# Patient Record
Sex: FEMALE | Race: WHITE | NOT HISPANIC OR LATINO | Employment: OTHER | URBAN - METROPOLITAN AREA
[De-identification: names, ages, dates, MRNs, and addresses within clinical notes are randomized per-mention and may not be internally consistent; named-entity substitution may affect disease eponyms.]

---

## 2018-09-17 ENCOUNTER — OFFICE VISIT (OUTPATIENT)
Dept: ENDOCRINOLOGY | Age: 78
End: 2018-09-17

## 2018-09-17 VITALS
WEIGHT: 179.4 LBS | HEART RATE: 77 BPM | SYSTOLIC BLOOD PRESSURE: 132 MMHG | HEIGHT: 67 IN | BODY MASS INDEX: 28.16 KG/M2 | DIASTOLIC BLOOD PRESSURE: 70 MMHG

## 2018-09-17 DIAGNOSIS — L65.9 HAIR LOSS: ICD-10-CM

## 2018-09-17 DIAGNOSIS — R53.82 CHRONIC FATIGUE: ICD-10-CM

## 2018-09-17 DIAGNOSIS — R79.89 ELEVATED DHEA: Primary | ICD-10-CM

## 2018-09-17 PROCEDURE — 99205 OFFICE O/P NEW HI 60 MIN: CPT | Performed by: INTERNAL MEDICINE

## 2018-09-17 RX ORDER — MELOXICAM 15 MG/1
15 TABLET ORAL DAILY
COMMUNITY
Start: 2018-09-12

## 2018-09-17 RX ORDER — PAROXETINE HYDROCHLORIDE 20 MG/1
20 TABLET, FILM COATED ORAL NIGHTLY
COMMUNITY
Start: 2018-09-12

## 2018-09-17 RX ORDER — OXYBUTYNIN CHLORIDE 10 MG/1
10 TABLET, EXTENDED RELEASE ORAL EVERY EVENING
COMMUNITY
Start: 2018-09-12

## 2018-09-17 RX ORDER — BETAMETHASONE DIPROPIONATE 0.5 MG/G
CREAM TOPICAL
COMMUNITY
Start: 2018-07-19 | End: 2021-08-09

## 2018-09-17 NOTE — PROGRESS NOTES
78 y.o.  Patient Care Team:  Provider, No Known as PCP - General    Chief complaint     NEW PATIENT CONSULT FOR ELEVATED DHEA.   C/O HAIR LOSS.    HPI   Patient is a 78-year-old white female came for consultation for elevated DHEA levels    Patient reported that she had been experiencing hair loss for about 3-4 years  She was evaluated by her primary care and sent to dermatology  Dermatologist apparently did some blood tests and noted that her DHEA levels were elevated  She was advised endocrine consultation    Patient was reporting extreme stress for the past one year  She apparently was remodeling her home and noted some itching areas on the skin 4 months and she has used betamethasone lotion for 4 months  She reported that she had hysterectomy in 1985 including the ovaries were taken out  Patient was on estrogen for about 10 years and then discontinued    Patient also reported that she used some kind of antifungal cream on her feet for 2 weeks prior to the lab testing  Extreme fatigue  Patient has been expressing fatigue for the past several months  Patient denied any headache or nausea or double vision  No history of head injury no recent weight gain or weight loss other than losing about 11 pounds due to hard work while she was remodeling her home  Patient denied any spotting    Patient had no prior history of congenital adrenal hyperplasia  She had children at the normal age  There is no family history of congenital adrenal hyperplasia    Lab reports were positive only for DHEA 39 and prolactin at 7.49 on August 1, 2018    Interval History      The following portions of the patient's history were reviewed and updated as appropriate: allergies, current medications, past family history, past medical history, past social history, past surgical history and problem list.    Past Medical History:   Diagnosis Date   • COPD (chronic obstructive pulmonary disease) (CMS/[facility])    • Lung infection        Family History    Problem Relation Age of Onset   • Heart disease Father    • Diabetes Paternal Aunt    • Diabetes Paternal Uncle        Social History     Social History   • Marital status: Unknown     Spouse name: N/A   • Number of children: N/A   • Years of education: N/A     Occupational History   • Not on file.     Social History Main Topics   • Smoking status: Not on file   • Smokeless tobacco: Not on file   • Alcohol use Not on file   • Drug use: Unknown   • Sexual activity: Not on file     Other Topics Concern   • Not on file     Social History Narrative   • No narrative on file       Allergies   Allergen Reactions   • Augmentin [Amoxicillin-Pot Clavulanate] Nausea And Vomiting   • Contrast Dye Unknown (See Comments)     BREATHING PROBLEMS         Current Outpatient Prescriptions:   •  betamethasone dipropionate (DIPROLENE) 0.05 % cream, , Disp: , Rfl:   •  meloxicam (MOBIC) 15 MG tablet, , Disp: , Rfl:   •  oxybutynin XL (DITROPAN-XL) 10 MG 24 hr tablet, , Disp: , Rfl:   •  PARoxetine (PAXIL) 20 MG tablet, , Disp: , Rfl:            Review of Systems   Constitutional: Positive for fatigue. Negative for chills and fever.   HENT: Negative for sore throat, trouble swallowing and voice change.    Eyes: Negative for pain and redness.   Respiratory: Negative for shortness of breath and wheezing.    Cardiovascular: Negative for chest pain and palpitations.   Gastrointestinal: Negative for abdominal pain, constipation, diarrhea, nausea and vomiting.   Endocrine: Negative for cold intolerance and heat intolerance.   Genitourinary: Positive for frequency.   Musculoskeletal: Negative for joint swelling.   Skin: Positive for rash (DRY SKIN ON HANDS AND ANKLES W/ITCHING). Negative for wound.   Neurological: Negative for tremors, light-headedness and headaches.   Hematological: Bruises/bleeds easily.   Psychiatric/Behavioral: Negative for confusion and sleep disturbance. The patient is not nervous/anxious.    All other systems reviewed  "and are negative.        Objective:  /70   Pulse 77   Ht 170.2 cm (67\")   Wt 81.4 kg (179 lb 6.4 oz)   BMI 28.10 kg/m²     Physical Exam   Constitutional: She is oriented to person, place, and time. She appears well-developed and well-nourished.   Eyes: Pupils are equal, round, and reactive to light. EOM are normal.   Neck: Normal range of motion. Neck supple.   Cardiovascular: Normal rate, regular rhythm, normal heart sounds and intact distal pulses.    Pulmonary/Chest: Effort normal and breath sounds normal.   Abdominal: Soft. Bowel sounds are normal. She exhibits distension.   Musculoskeletal: Normal range of motion. She exhibits no edema.   Neurological: She is alert and oriented to person, place, and time.   Skin: Skin is warm and dry.   Psychiatric: She has a normal mood and affect. Her behavior is normal.   Nursing note and vitals reviewed.        Results Review:    I reviewed the patient's new clinical results.  No results found for any previous visit.       No images are attached to the encounter.    Loc was seen today for abnormal lab.    Diagnoses and all orders for this visit:    Elevated DHEA (CMS/HCC)    Hair loss  -     T4, Free; Future  -     TSH; Future  -     Cortisol; Future  -     ACTH; Future  -     Sex Horm Binding Globulin; Future  -     Testosterone, Free, Total; Future  -     Estrogens, Total; Future  -     FSH & LH; Future  -     Comprehensive Metabolic Panel; Future  -     DHEA; Future  -     Prolactin; Future    Chronic fatigue  -     T4, Free; Future  -     TSH; Future  -     Cortisol; Future  -     ACTH; Future  -     Sex Horm Binding Globulin; Future  -     Testosterone, Free, Total; Future  -     Estrogens, Total; Future  -     FSH & LH; Future  -     Comprehensive Metabolic Panel; Future  -     DHEA; Future  -     Prolactin; Future        I discussed the various possibilities for the abnormal DHEA levels  It could be a lab error  She was also using antifungal cream at the " same time when these labs were drawn  It is possible that the medication was influencing the DHEA levels  No other corresponding hormones were checked  Patient does not have ovaries since they were removed in 1985 with hysterectomy    Excess DHEA production could only be adrenal source at this time  Patient had no other evaluation for adrenal function  She denies having had any imaging for adrenal glands such as CT abdomen    Patient is currently not taking any herbal remedies  She still uses betamethasone cream  Patient denied any galactorrhea  Last mammogram was normal    Fatigue and hair loss at the age of 78 could most likely be related to stress and I think it  elevated DHEA level not contributory    All the same sorts check all the adrenal hormones  I would also check pituitary hormone status along with thyroid  Patient is advised to get fasting lab tests done  After the lab results have been reviewed she'll be notified of any further plans for evaluation and management  Both patient and her daughter verbalized understanding    The total time spent  was more than 60 min of which greater than 35 min ( greater than 50% of the total time ) was spent face to face on counseling the patient on recommended evaluation and treatment options, instructions for management/treatment and /or follow up  and importance of compliance with chosen management or treatment options

## 2018-09-22 ENCOUNTER — RESULTS ENCOUNTER (OUTPATIENT)
Dept: ENDOCRINOLOGY | Age: 78
End: 2018-09-22

## 2018-09-22 DIAGNOSIS — R53.82 CHRONIC FATIGUE: ICD-10-CM

## 2018-09-22 DIAGNOSIS — L65.9 HAIR LOSS: ICD-10-CM

## 2018-09-23 PROBLEM — R79.89 ELEVATED DHEA: Status: ACTIVE | Noted: 2018-09-23

## 2018-10-03 ENCOUNTER — TELEPHONE (OUTPATIENT)
Dept: ENDOCRINOLOGY | Age: 78
End: 2018-10-03

## 2018-10-03 NOTE — TELEPHONE ENCOUNTER
----- Message from Ryleesindy Boyd sent at 10/1/2018  2:10 PM EDT -----  Contact: PATIENT   Patient has called in regards to getting her lab results from dr Amaya. Patient has requested this information from [facility]. Patient is requesting a call back once dr amaya review her results. [phone]     Called patient with normal lab results, left via voicemail.

## 2019-01-04 ENCOUNTER — TELEPHONE (OUTPATIENT)
Dept: ENDOCRINOLOGY | Age: 79
End: 2019-01-04

## 2019-01-04 NOTE — TELEPHONE ENCOUNTER
----- Message from Tiffany Lakhani MA sent at 1/4/2019  1:04 PM EST -----  Contact: Pt.  PT called asking that her lab results from last visit be faxed to [facility]  Fax# [phone]  So they will be there for her appt. Call pt  When done.  ATT. MAGDA HERR      Labs have been faxed to pcp

## 2019-10-16 ENCOUNTER — TRANSCRIBE ORDERS (OUTPATIENT)
Dept: ADMINISTRATIVE | Facility: HOSPITAL | Age: 79
End: 2019-10-16

## 2021-08-02 ENCOUNTER — TRANSCRIBE ORDERS (OUTPATIENT)
Dept: PREADMISSION TESTING | Facility: HOSPITAL | Age: 81
End: 2021-08-02

## 2021-08-02 DIAGNOSIS — Z01.818 OTHER SPECIFIED PRE-OPERATIVE EXAMINATION: Primary | ICD-10-CM

## 2021-08-09 ENCOUNTER — HOSPITAL ENCOUNTER (OUTPATIENT)
Dept: GENERAL RADIOLOGY | Facility: HOSPITAL | Age: 81
End: 2021-08-09

## 2021-08-09 ENCOUNTER — HOSPITAL ENCOUNTER (OUTPATIENT)
Dept: GENERAL RADIOLOGY | Facility: HOSPITAL | Age: 81
Discharge: HOME | End: 2021-08-09

## 2021-08-09 ENCOUNTER — PRE-ADMISSION TESTING (OUTPATIENT)
Dept: PREADMISSION TESTING | Facility: HOSPITAL | Age: 81
End: 2021-08-09

## 2021-08-09 VITALS
RESPIRATION RATE: 16 BRPM | HEIGHT: 68 IN | DIASTOLIC BLOOD PRESSURE: 79 MMHG | HEART RATE: 72 BPM | BODY MASS INDEX: 27.43 KG/M2 | TEMPERATURE: 97 F | SYSTOLIC BLOOD PRESSURE: 149 MMHG | WEIGHT: 181 LBS | OXYGEN SATURATION: 98 %

## 2021-08-09 LAB
ALBUMIN SERPL-MCNC: 4.5 G/DL (ref 3.5–5.2)
ALBUMIN/GLOB SERPL: 2 G/DL
ALP SERPL-CCNC: 113 U/L (ref 39–117)
ALT SERPL W P-5'-P-CCNC: 13 U/L (ref 1–33)
ANION GAP SERPL CALCULATED.3IONS-SCNC: 12 MMOL/L (ref 5–15)
APTT PPP: 33.5 SECONDS (ref 22.7–35.4)
AST SERPL-CCNC: 25 U/L (ref 1–32)
BACTERIA UR QL AUTO: ABNORMAL /HPF
BASOPHILS # BLD AUTO: 0.04 10*3/MM3 (ref 0–0.2)
BASOPHILS NFR BLD AUTO: 0.6 % (ref 0–1.5)
BILIRUB SERPL-MCNC: 0.5 MG/DL (ref 0–1.2)
BILIRUB UR QL STRIP: NEGATIVE
BUN SERPL-MCNC: 17 MG/DL (ref 8–23)
BUN/CREAT SERPL: 19.5 (ref 7–25)
CALCIUM SPEC-SCNC: 9.6 MG/DL (ref 8.6–10.5)
CHLORIDE SERPL-SCNC: 104 MMOL/L (ref 98–107)
CLARITY UR: CLEAR
CO2 SERPL-SCNC: 24 MMOL/L (ref 22–29)
COLOR UR: YELLOW
CREAT SERPL-MCNC: 0.87 MG/DL (ref 0.57–1)
DEPRECATED RDW RBC AUTO: 46.5 FL (ref 37–54)
EOSINOPHIL # BLD AUTO: 0.09 10*3/MM3 (ref 0–0.4)
EOSINOPHIL NFR BLD AUTO: 1.4 % (ref 0.3–6.2)
ERYTHROCYTE [DISTWIDTH] IN BLOOD BY AUTOMATED COUNT: 12.5 % (ref 12.3–15.4)
GFR SERPL CREATININE-BSD FRML MDRD: 63 ML/MIN/1.73
GLOBULIN UR ELPH-MCNC: 2.2 GM/DL
GLUCOSE SERPL-MCNC: 105 MG/DL (ref 65–99)
GLUCOSE UR STRIP-MCNC: NEGATIVE MG/DL
HCT VFR BLD AUTO: 49.2 % (ref 34–46.6)
HGB BLD-MCNC: 15.9 G/DL (ref 12–15.9)
HGB UR QL STRIP.AUTO: ABNORMAL
HYALINE CASTS UR QL AUTO: ABNORMAL /LPF
IMM GRANULOCYTES # BLD AUTO: 0.01 10*3/MM3 (ref 0–0.05)
IMM GRANULOCYTES NFR BLD AUTO: 0.2 % (ref 0–0.5)
INR PPP: 1.02 (ref 0.9–1.1)
KETONES UR QL STRIP: ABNORMAL
LEUKOCYTE ESTERASE UR QL STRIP.AUTO: ABNORMAL
LYMPHOCYTES # BLD AUTO: 1.25 10*3/MM3 (ref 0.7–3.1)
LYMPHOCYTES NFR BLD AUTO: 19.9 % (ref 19.6–45.3)
MCH RBC QN AUTO: 32.3 PG (ref 26.6–33)
MCHC RBC AUTO-ENTMCNC: 32.3 G/DL (ref 31.5–35.7)
MCV RBC AUTO: 99.8 FL (ref 79–97)
MONOCYTES # BLD AUTO: 0.51 10*3/MM3 (ref 0.1–0.9)
MONOCYTES NFR BLD AUTO: 8.1 % (ref 5–12)
NEUTROPHILS NFR BLD AUTO: 4.37 10*3/MM3 (ref 1.7–7)
NEUTROPHILS NFR BLD AUTO: 69.8 % (ref 42.7–76)
NITRITE UR QL STRIP: NEGATIVE
NRBC BLD AUTO-RTO: 0 /100 WBC (ref 0–0.2)
PH UR STRIP.AUTO: 5.5 [PH] (ref 5–8)
PLATELET # BLD AUTO: 204 10*3/MM3 (ref 140–450)
PMV BLD AUTO: 9.7 FL (ref 6–12)
POTASSIUM SERPL-SCNC: 4.7 MMOL/L (ref 3.5–5.2)
PROT SERPL-MCNC: 6.7 G/DL (ref 6–8.5)
PROT UR QL STRIP: NEGATIVE
PROTHROMBIN TIME: 13.2 SECONDS (ref 11.7–14.2)
QT INTERVAL: 392 MS
RBC # BLD AUTO: 4.93 10*6/MM3 (ref 3.77–5.28)
RBC # UR: ABNORMAL /HPF
REF LAB TEST METHOD: ABNORMAL
SODIUM SERPL-SCNC: 140 MMOL/L (ref 136–145)
SP GR UR STRIP: 1.02 (ref 1–1.03)
SQUAMOUS #/AREA URNS HPF: ABNORMAL /HPF
UROBILINOGEN UR QL STRIP: ABNORMAL
WBC # BLD AUTO: 6.27 10*3/MM3 (ref 3.4–10.8)
WBC UR QL AUTO: ABNORMAL /HPF

## 2021-08-09 PROCEDURE — 71046 X-RAY EXAM CHEST 2 VIEWS: CPT

## 2021-08-09 PROCEDURE — 85025 COMPLETE CBC W/AUTO DIFF WBC: CPT

## 2021-08-09 PROCEDURE — 73560 X-RAY EXAM OF KNEE 1 OR 2: CPT

## 2021-08-09 PROCEDURE — 93010 ELECTROCARDIOGRAM REPORT: CPT | Performed by: INTERNAL MEDICINE

## 2021-08-09 PROCEDURE — 81001 URINALYSIS AUTO W/SCOPE: CPT

## 2021-08-09 PROCEDURE — 85610 PROTHROMBIN TIME: CPT

## 2021-08-09 PROCEDURE — 93005 ELECTROCARDIOGRAM TRACING: CPT

## 2021-08-09 PROCEDURE — 63710000001 MUPIROCIN 2 % OINTMENT: Performed by: ORTHOPAEDIC SURGERY

## 2021-08-09 PROCEDURE — 85730 THROMBOPLASTIN TIME PARTIAL: CPT

## 2021-08-09 PROCEDURE — 36415 COLL VENOUS BLD VENIPUNCTURE: CPT

## 2021-08-09 PROCEDURE — A9270 NON-COVERED ITEM OR SERVICE: HCPCS | Performed by: ORTHOPAEDIC SURGERY

## 2021-08-09 PROCEDURE — 80053 COMPREHEN METABOLIC PANEL: CPT

## 2021-08-09 PROCEDURE — 87086 URINE CULTURE/COLONY COUNT: CPT

## 2021-08-09 RX ORDER — BIOTIN 10000 MCG
1 CAPSULE ORAL EVERY MORNING
COMMUNITY

## 2021-08-09 RX ORDER — MULTIPLE VITAMINS W/ MINERALS TAB 9MG-400MCG
1 TAB ORAL DAILY
COMMUNITY

## 2021-08-09 RX ORDER — ASCORBIC ACID 125 MG
1 TABLET,CHEWABLE ORAL EVERY MORNING
COMMUNITY

## 2021-08-09 RX ORDER — ACETAMINOPHEN 500 MG
500 TABLET ORAL EVERY 6 HOURS PRN
COMMUNITY

## 2021-08-09 RX ORDER — DOCUSATE SODIUM 100 MG/1
100 CAPSULE, LIQUID FILLED ORAL EVERY MORNING
COMMUNITY

## 2021-08-09 RX ORDER — ASPIRIN 81 MG/1
81 TABLET ORAL DAILY
COMMUNITY
End: 2021-08-24 | Stop reason: HOSPADM

## 2021-08-09 ASSESSMENT — KOOS JR
KOOS JR SCORE: 44.905
KOOS JR SCORE: 17

## 2021-08-09 NOTE — DISCHARGE INSTRUCTIONS
Take the following medications the morning of surgery:    NONE    ARRIVE AT 7:00      If you are on prescription narcotic pain medication to control your pain you may also take that medication the morning of surgery.    General Instructions:  • Do not eat solid food after midnight the night before surgery.  • You may drink clear liquids day of surgery but must stop at least one hour before your hospital arrival time.  • It is beneficial for you to have a clear drink that contains carbohydrates the day of surgery.  We suggest a 12 to 20 ounce bottle of Gatorade or Powerade for non-diabetic patients or a 12 to 20 ounce bottle of G2 or Powerade Zero for diabetic patients. (Pediatric patients, are not advised to drink a 12 to 20 ounce carbohydrate drink)    Clear liquids are liquids you can see through.  Nothing red in color.     Plain water                               Sports drinks  Sodas                                   Gelatin (Jell-O)  Fruit juices without pulp such as white grape juice and apple juice  Popsicles that contain no fruit or yogurt  Tea or coffee (no cream or milk added)  Gatorade / Powerade  G2 / Powerade Zero    •   • Patients who avoid smoking, chewing tobacco and alcohol for 4 weeks prior to surgery have a reduced risk of post-operative complications.  Quit smoking as many days before surgery as you can.  • Do not smoke, use chewing tobacco or drink alcohol the day of surgery.   • If applicable bring your C-PAP/ BI-PAP machine.  • Bring any papers given to you in the doctor’s office.  • Wear clean comfortable clothes.  • Do not wear contact lenses, false eyelashes or make-up.  Bring a case for your glasses.   • Bring crutches or walker if applicable.  • Remove all piercings.  Leave jewelry and any other valuables at home.  • Hair extensions with metal clips must be removed prior to surgery.  • The Pre-Admission Testing nurse will instruct you to bring medications if unable to obtain an accurate  list in Pre-Admission Testing.          Preventing a Surgical Site Infection:  • For 2 to 3 days before surgery, avoid shaving with a razor because the razor can irritate skin and make it easier to develop an infection.    • Any areas of open skin can increase the risk of a post-operative wound infection by allowing bacteria to enter and travel throughout the body.  Notify your surgeon if you have any skin wounds / rashes even if it is not near the expected surgical site.  The area will need assessed to determine if surgery should be delayed until it is healed.  • The night prior to surgery shower using a fresh bar of anti-bacterial soap (such as Dial) and clean washcloth.  Sleep in a clean bed with clean clothing.  Do not allow pets to sleep with you.  • Shower on the morning of surgery using a fresh bar of anti-bacterial soap (such as Dial) and clean washcloth.  Dry with a clean towel and dress in clean clothing.  • Ask your surgeon if you will be receiving antibiotics prior to surgery.  • Make sure you, your family, and all healthcare providers clean their hands with soap and water or an alcohol based hand  before caring for you or your wound.    Day of surgery:  Your arrival time is approximately two hours before your scheduled surgery time.  Upon arrival, a Pre-op nurse and Anesthesiologist will review your health history, obtain vital signs, and answer questions you may have.  The only belongings needed at this time will be a list of your home medications and if applicable your C-PAP/BI-PAP machine.  A Pre-op nurse will start an IV and you may receive medication in preparation for surgery, including something to help you relax.     Please be aware that surgery does come with discomfort.  We want to make every effort to control your discomfort so please discuss any uncontrolled symptoms with your nurse.   Your doctor will most likely have prescribed pain medications.      If you are going home after  surgery you will receive individualized written care instructions before being discharged.  A responsible adult must drive you to and from the hospital on the day of your surgery and stay with you for 24 hours.  Discharge prescriptions can be filled by the hospital pharmacy during regular pharmacy hours.  If you are having surgery late in the day/evening your prescription may be e-prescribed to your pharmacy.  Please verify your pharmacy hours or chose a 24 hour pharmacy to avoid not having access to your prescription because your pharmacy has closed for the day.    If you are staying overnight following surgery, you will be transported to your hospital room following the recovery period.  [facility] has all private rooms.    If you have any questions please call Pre-Admission Testing at [phone].  Deductibles and co-payments are collected on the day of service. Please be prepared to pay the required co-pay, deductible or deposit on the day of service as defined by your plan.    Patient Education for Self-Quarantine Process    • Following your COVID testing, we strongly recommend that you wear a mask when you are with other people and practice social distancing.   • Limit your activities to only required outings.  • Wash your hands with soap and water frequently for at least 20 seconds.   • Avoid touching your eyes, nose and mouth with unwashed hands.  • Do not share anything - utensils, drinking glasses, food from the same bowl.   • Sanitize household surfaces daily. Include all high touch areas (door handles, light switches, phones, countertops, etc.)    Call your surgeon immediately if you experience any of the following symptoms:  • Sore Throat  • Shortness of Breath or difficulty breathing  • Cough  • Chills  • Body soreness or muscle pain  • Headache  • Fever  • New loss of taste or smell  • Do not arrive for your surgery ill.  Your procedure will need to be rescheduled to another time.   You will need to call your physician before the day of surgery to avoid any unnecessary exposure to hospital staff as well as other patients.    CHLORHEXIDINE CLOTH INSTRUCTIONS  The morning of surgery follow these instructions using the Chlorhexidine cloths you've been given.  These steps reduce bacteria on the body.  Do not use the cloths near your eyes, ears mouth, genitalia or on open wounds.  Throw the cloths away after use but do not try to flush them down a toilet.      • Open and remove one cloth at a time from the package.    • Leave the cloth unfolded and begin the bathing.  • Massage the skin with the cloths using gentle pressure to remove bacteria.  Do not scrub harshly.   • Follow the steps below with one 2% CHG cloth per area (6 total cloths).  • One cloth for neck, shoulders and chest.  • One cloth for both arms, hands, fingers and underarms (do underarms last).  • One cloth for the abdomen followed by groin.  • One cloth for right leg and foot including between the toes.  • One cloth for left leg and foot including between the toes.  • The last cloth is to be used for the back of the neck, back and buttocks.    Allow the CHG to air dry 3 minutes on the skin which will give it time to work and decrease the chance of irritation.  The skin may feel sticky until it is dry.  Do not rinse with water or any other liquid or you will lose the beneficial effects of the CHG.  If mild skin irritation occurs, do rinse the skin to remove the CHG.  Report this to the nurse at time of admission.  Do not apply lotions, creams, ointments, deodorants or perfumes after using the clothes. Dress in clean clothes before coming to the hospital.    BACTROBAN NASAL OINTMENT  There are many germs normally in your nose. Bactroban is an ointment that will help reduce these germs. Please follow these instructions for Bactroban use:      ____The day before surgery in the morning  Date________    ____The day before surgery in the  evening              Date________    ____The day of surgery in the morning    Date________    **Squirt ½ package of Bactroban Ointment onto a cotton applicator and apply to inside of 1st nostril.  Squirt the remaining Bactroban and apply to the inside of the other nostril.    .

## 2021-08-11 LAB — BACTERIA SPEC AEROBE CULT: NO GROWTH

## 2021-08-23 ENCOUNTER — ANESTHESIA EVENT (OUTPATIENT)
Dept: PERIOP | Facility: HOSPITAL | Age: 81
End: 2021-08-23

## 2021-08-23 ENCOUNTER — APPOINTMENT (OUTPATIENT)
Dept: GENERAL RADIOLOGY | Facility: HOSPITAL | Age: 81
End: 2021-08-23

## 2021-08-23 ENCOUNTER — HOSPITAL ENCOUNTER (OUTPATIENT)
Facility: HOSPITAL | Age: 81
Discharge: HOME HEALTH | End: 2021-08-24
Attending: ORTHOPAEDIC SURGERY | Admitting: ORTHOPAEDIC SURGERY

## 2021-08-23 ENCOUNTER — ANESTHESIA (OUTPATIENT)
Dept: PERIOP | Facility: HOSPITAL | Age: 81
End: 2021-08-23

## 2021-08-23 DIAGNOSIS — Z96.652 S/P TOTAL KNEE ARTHROPLASTY, LEFT: ICD-10-CM

## 2021-08-23 DIAGNOSIS — M17.12 PRIMARY OSTEOARTHRITIS OF LEFT KNEE: Primary | ICD-10-CM

## 2021-08-23 PROBLEM — M17.9 DJD (DEGENERATIVE JOINT DISEASE) OF KNEE: Status: ACTIVE | Noted: 2021-08-23

## 2021-08-23 LAB
HBV SURFACE AG SERPL QL IA: NORMAL
SARS-COV-2 RNA PNL SPEC NAA+PROBE: NOT DETECTED

## 2021-08-23 PROCEDURE — A9270 NON-COVERED ITEM OR SERVICE: HCPCS | Performed by: ORTHOPAEDIC SURGERY

## 2021-08-23 PROCEDURE — 87635 SARS-COV-2 COVID-19 AMP PRB: CPT | Performed by: ORTHOPAEDIC SURGERY

## 2021-08-23 PROCEDURE — 73560 X-RAY EXAM OF KNEE 1 OR 2: CPT

## 2021-08-23 PROCEDURE — 63710000001 PAROXETINE 20 MG TABLET: Performed by: ORTHOPAEDIC SURGERY

## 2021-08-23 PROCEDURE — 63710000001 OXYCODONE-ACETAMINOPHEN 5-325 MG TABLET: Performed by: ORTHOPAEDIC SURGERY

## 2021-08-23 PROCEDURE — 25010000002 ONDANSETRON PER 1 MG: Performed by: NURSE ANESTHETIST, CERTIFIED REGISTERED

## 2021-08-23 PROCEDURE — C1776 JOINT DEVICE (IMPLANTABLE): HCPCS | Performed by: ORTHOPAEDIC SURGERY

## 2021-08-23 PROCEDURE — 25010000003 CEFAZOLIN IN DEXTROSE 2-4 GM/100ML-% SOLUTION: Performed by: NURSE ANESTHETIST, CERTIFIED REGISTERED

## 2021-08-23 PROCEDURE — 25010000002 FENTANYL CITRATE (PF) 50 MCG/ML SOLUTION: Performed by: NURSE ANESTHETIST, CERTIFIED REGISTERED

## 2021-08-23 PROCEDURE — 63710000001 ASPIRIN EC 325 MG TABLET DELAYED-RELEASE: Performed by: ORTHOPAEDIC SURGERY

## 2021-08-23 PROCEDURE — 25010000003 BUPIVACAINE LIPOSOME 1.3 % SUSPENSION 20 ML VIAL: Performed by: ORTHOPAEDIC SURGERY

## 2021-08-23 PROCEDURE — 25010000003 CEFAZOLIN IN DEXTROSE 2-4 GM/100ML-% SOLUTION: Performed by: ORTHOPAEDIC SURGERY

## 2021-08-23 PROCEDURE — 25010000002 KETOROLAC TROMETHAMINE PER 15 MG: Performed by: ORTHOPAEDIC SURGERY

## 2021-08-23 PROCEDURE — 97110 THERAPEUTIC EXERCISES: CPT

## 2021-08-23 PROCEDURE — G0432 EIA HIV-1/HIV-2 SCREEN: HCPCS | Performed by: ORTHOPAEDIC SURGERY

## 2021-08-23 PROCEDURE — 86803 HEPATITIS C AB TEST: CPT | Performed by: ORTHOPAEDIC SURGERY

## 2021-08-23 PROCEDURE — 25010000002 SUCCINYLCHOLINE PER 20 MG: Performed by: NURSE ANESTHETIST, CERTIFIED REGISTERED

## 2021-08-23 PROCEDURE — C1713 ANCHOR/SCREW BN/BN,TIS/BN: HCPCS | Performed by: ORTHOPAEDIC SURGERY

## 2021-08-23 PROCEDURE — 63710000001 POVIDONE-IODINE 10 % SOLUTION 30 ML BOTTLE: Performed by: ORTHOPAEDIC SURGERY

## 2021-08-23 PROCEDURE — 63710000001 ACETAMINOPHEN 500 MG TABLET: Performed by: ORTHOPAEDIC SURGERY

## 2021-08-23 PROCEDURE — 63710000001 OXYBUTYNIN XL 10 MG TABLET SUSTAINED-RELEASE 24 HOUR: Performed by: ORTHOPAEDIC SURGERY

## 2021-08-23 PROCEDURE — 25010000002 VANCOMYCIN PER 500 MG: Performed by: ORTHOPAEDIC SURGERY

## 2021-08-23 PROCEDURE — 25010000002 PROPOFOL 10 MG/ML EMULSION: Performed by: NURSE ANESTHETIST, CERTIFIED REGISTERED

## 2021-08-23 PROCEDURE — 87340 HEPATITIS B SURFACE AG IA: CPT | Performed by: ORTHOPAEDIC SURGERY

## 2021-08-23 PROCEDURE — 97161 PT EVAL LOW COMPLEX 20 MIN: CPT

## 2021-08-23 PROCEDURE — 25010000002 DEXAMETHASONE PER 1 MG: Performed by: NURSE ANESTHETIST, CERTIFIED REGISTERED

## 2021-08-23 PROCEDURE — C9290 INJ, BUPIVACAINE LIPOSOME: HCPCS | Performed by: ORTHOPAEDIC SURGERY

## 2021-08-23 DEVICE — IMPLANTABLE DEVICE: Type: IMPLANTABLE DEVICE | Site: KNEE | Status: FUNCTIONAL

## 2021-08-23 DEVICE — JOURNEY II BCS CONSTRAINED                                    ARTICULAR INSERT SIZE 3-4 LEFT 10MM
Type: IMPLANTABLE DEVICE | Site: KNEE | Status: FUNCTIONAL
Brand: JOURNEY

## 2021-08-23 DEVICE — JOURNEY TIBIAL BASEPLATE NONPOROUS                                    LEFT SIZE 4
Type: IMPLANTABLE DEVICE | Site: KNEE | Status: FUNCTIONAL
Brand: JOURNEY

## 2021-08-23 DEVICE — JOURNEY II BCS FEMORAL OXINIUM                                    LEFT SIZE 6
Type: IMPLANTABLE DEVICE | Site: KNEE | Status: FUNCTIONAL
Brand: JOURNEY

## 2021-08-23 DEVICE — PALACOS® R IS A FAST-CURING, RADIOPAQUE, POLY(METHYL METHACRYLATE)-BASED BONE CEMENT.PALACOS ® R CONTAINS THE X-RAY CONTRAST MEDIUM ZIRCONIUM DIOXIDE. TO IMPROVE VISIBILITY IN THE SURGICAL FIELD PALACOS ® R HAS BEEN COLOURED WITH CHLOROPHYLL (E141). THE BONE CEMENT IS PREPARED DIRECTLY BEFORE USE BY MIXING A POLYMER POWDER COMPONENT WITH A LIQUID MONOMER COMPONENT. A DUCTILE DOUGH FORMS WHICH CURES WITHIN A FEW MINUTES.
Type: IMPLANTABLE DEVICE | Site: KNEE | Status: FUNCTIONAL
Brand: PALACOS®

## 2021-08-23 DEVICE — JOURNEY 7.5 ROUND RESURF PAT 32MM STANDARD
Type: IMPLANTABLE DEVICE | Site: KNEE | Status: FUNCTIONAL
Brand: JOURNEY

## 2021-08-23 RX ORDER — PROPOFOL 10 MG/ML
VIAL (ML) INTRAVENOUS AS NEEDED
Status: DISCONTINUED | OUTPATIENT
Start: 2021-08-23 | End: 2021-08-23 | Stop reason: SURG

## 2021-08-23 RX ORDER — VANCOMYCIN HYDROCHLORIDE 1 G/200ML
1000 INJECTION, SOLUTION INTRAVENOUS ONCE
Status: COMPLETED | OUTPATIENT
Start: 2021-08-23 | End: 2021-08-23

## 2021-08-23 RX ORDER — LIDOCAINE HYDROCHLORIDE 20 MG/ML
INJECTION, SOLUTION INFILTRATION; PERINEURAL AS NEEDED
Status: DISCONTINUED | OUTPATIENT
Start: 2021-08-23 | End: 2021-08-23 | Stop reason: SURG

## 2021-08-23 RX ORDER — CLINDAMYCIN PHOSPHATE 600 MG/50ML
600 INJECTION INTRAVENOUS ONCE
Status: COMPLETED | OUTPATIENT
Start: 2021-08-23 | End: 2021-08-23

## 2021-08-23 RX ORDER — FAMOTIDINE 10 MG/ML
20 INJECTION, SOLUTION INTRAVENOUS ONCE
Status: COMPLETED | OUTPATIENT
Start: 2021-08-23 | End: 2021-08-23

## 2021-08-23 RX ORDER — ACETAMINOPHEN 650 MG/1
650 SUPPOSITORY RECTAL EVERY 4 HOURS PRN
Status: DISCONTINUED | OUTPATIENT
Start: 2021-08-23 | End: 2021-08-24 | Stop reason: HOSPADM

## 2021-08-23 RX ORDER — MIDAZOLAM HYDROCHLORIDE 1 MG/ML
0.5 INJECTION INTRAMUSCULAR; INTRAVENOUS
Status: DISCONTINUED | OUTPATIENT
Start: 2021-08-23 | End: 2021-08-23 | Stop reason: HOSPADM

## 2021-08-23 RX ORDER — SODIUM CHLORIDE, SODIUM LACTATE, POTASSIUM CHLORIDE, CALCIUM CHLORIDE 600; 310; 30; 20 MG/100ML; MG/100ML; MG/100ML; MG/100ML
9 INJECTION, SOLUTION INTRAVENOUS CONTINUOUS
Status: DISCONTINUED | OUTPATIENT
Start: 2021-08-23 | End: 2021-08-24 | Stop reason: HOSPADM

## 2021-08-23 RX ORDER — FENTANYL CITRATE 50 UG/ML
INJECTION, SOLUTION INTRAMUSCULAR; INTRAVENOUS AS NEEDED
Status: DISCONTINUED | OUTPATIENT
Start: 2021-08-23 | End: 2021-08-23 | Stop reason: SURG

## 2021-08-23 RX ORDER — ASPIRIN 325 MG
325 TABLET, DELAYED RELEASE (ENTERIC COATED) ORAL DAILY
Status: DISCONTINUED | OUTPATIENT
Start: 2021-08-23 | End: 2021-08-24 | Stop reason: HOSPADM

## 2021-08-23 RX ORDER — MAGNESIUM HYDROXIDE 1200 MG/15ML
LIQUID ORAL AS NEEDED
Status: DISCONTINUED | OUTPATIENT
Start: 2021-08-23 | End: 2021-08-23 | Stop reason: HOSPADM

## 2021-08-23 RX ORDER — SODIUM CHLORIDE, SODIUM LACTATE, POTASSIUM CHLORIDE, CALCIUM CHLORIDE 600; 310; 30; 20 MG/100ML; MG/100ML; MG/100ML; MG/100ML
100 INJECTION, SOLUTION INTRAVENOUS CONTINUOUS
Status: DISCONTINUED | OUTPATIENT
Start: 2021-08-23 | End: 2021-08-24 | Stop reason: HOSPADM

## 2021-08-23 RX ORDER — GLYCOPYRROLATE 0.2 MG/ML
INJECTION INTRAMUSCULAR; INTRAVENOUS AS NEEDED
Status: DISCONTINUED | OUTPATIENT
Start: 2021-08-23 | End: 2021-08-23 | Stop reason: SURG

## 2021-08-23 RX ORDER — NALOXONE HCL 0.4 MG/ML
0.2 VIAL (ML) INJECTION AS NEEDED
Status: DISCONTINUED | OUTPATIENT
Start: 2021-08-23 | End: 2021-08-23 | Stop reason: HOSPADM

## 2021-08-23 RX ORDER — LABETALOL HYDROCHLORIDE 5 MG/ML
5 INJECTION, SOLUTION INTRAVENOUS
Status: DISCONTINUED | OUTPATIENT
Start: 2021-08-23 | End: 2021-08-23 | Stop reason: HOSPADM

## 2021-08-23 RX ORDER — OXYBUTYNIN CHLORIDE 10 MG/1
10 TABLET, EXTENDED RELEASE ORAL EVERY EVENING
Status: DISCONTINUED | OUTPATIENT
Start: 2021-08-23 | End: 2021-08-24 | Stop reason: HOSPADM

## 2021-08-23 RX ORDER — OXYCODONE HYDROCHLORIDE AND ACETAMINOPHEN 5; 325 MG/1; MG/1
2 TABLET ORAL EVERY 4 HOURS PRN
Status: DISCONTINUED | OUTPATIENT
Start: 2021-08-23 | End: 2021-08-24 | Stop reason: HOSPADM

## 2021-08-23 RX ORDER — ACETAMINOPHEN 325 MG/1
325 TABLET ORAL EVERY 4 HOURS PRN
Status: DISCONTINUED | OUTPATIENT
Start: 2021-08-23 | End: 2021-08-24 | Stop reason: HOSPADM

## 2021-08-23 RX ORDER — EPHEDRINE SULFATE 50 MG/ML
5 INJECTION, SOLUTION INTRAVENOUS ONCE AS NEEDED
Status: DISCONTINUED | OUTPATIENT
Start: 2021-08-23 | End: 2021-08-23 | Stop reason: HOSPADM

## 2021-08-23 RX ORDER — PROMETHAZINE HYDROCHLORIDE 25 MG/1
25 TABLET ORAL ONCE AS NEEDED
Status: DISCONTINUED | OUTPATIENT
Start: 2021-08-23 | End: 2021-08-23 | Stop reason: HOSPADM

## 2021-08-23 RX ORDER — ONDANSETRON 2 MG/ML
4 INJECTION INTRAMUSCULAR; INTRAVENOUS ONCE AS NEEDED
Status: COMPLETED | OUTPATIENT
Start: 2021-08-23 | End: 2021-08-23

## 2021-08-23 RX ORDER — PROMETHAZINE HYDROCHLORIDE 25 MG/1
25 SUPPOSITORY RECTAL ONCE AS NEEDED
Status: DISCONTINUED | OUTPATIENT
Start: 2021-08-23 | End: 2021-08-23 | Stop reason: HOSPADM

## 2021-08-23 RX ORDER — HYDRALAZINE HYDROCHLORIDE 20 MG/ML
5 INJECTION INTRAMUSCULAR; INTRAVENOUS
Status: DISCONTINUED | OUTPATIENT
Start: 2021-08-23 | End: 2021-08-23 | Stop reason: HOSPADM

## 2021-08-23 RX ORDER — FENTANYL CITRATE 50 UG/ML
50 INJECTION, SOLUTION INTRAMUSCULAR; INTRAVENOUS
Status: DISCONTINUED | OUTPATIENT
Start: 2021-08-23 | End: 2021-08-23 | Stop reason: HOSPADM

## 2021-08-23 RX ORDER — ACETAMINOPHEN 500 MG
1000 TABLET ORAL ONCE
Status: COMPLETED | OUTPATIENT
Start: 2021-08-23 | End: 2021-08-23

## 2021-08-23 RX ORDER — ONDANSETRON 2 MG/ML
4 INJECTION INTRAMUSCULAR; INTRAVENOUS EVERY 6 HOURS PRN
Status: DISCONTINUED | OUTPATIENT
Start: 2021-08-23 | End: 2021-08-24 | Stop reason: HOSPADM

## 2021-08-23 RX ORDER — FLUMAZENIL 0.1 MG/ML
0.2 INJECTION INTRAVENOUS AS NEEDED
Status: DISCONTINUED | OUTPATIENT
Start: 2021-08-23 | End: 2021-08-23 | Stop reason: HOSPADM

## 2021-08-23 RX ORDER — OXYCODONE HYDROCHLORIDE AND ACETAMINOPHEN 5; 325 MG/1; MG/1
1 TABLET ORAL EVERY 4 HOURS PRN
Status: DISCONTINUED | OUTPATIENT
Start: 2021-08-23 | End: 2021-08-24 | Stop reason: HOSPADM

## 2021-08-23 RX ORDER — SUCCINYLCHOLINE CHLORIDE 20 MG/ML
INJECTION INTRAMUSCULAR; INTRAVENOUS AS NEEDED
Status: DISCONTINUED | OUTPATIENT
Start: 2021-08-23 | End: 2021-08-23 | Stop reason: SURG

## 2021-08-23 RX ORDER — ACETAMINOPHEN 325 MG/1
650 TABLET ORAL EVERY 4 HOURS PRN
Status: DISCONTINUED | OUTPATIENT
Start: 2021-08-23 | End: 2021-08-24 | Stop reason: HOSPADM

## 2021-08-23 RX ORDER — DEXAMETHASONE SODIUM PHOSPHATE 10 MG/ML
INJECTION INTRAMUSCULAR; INTRAVENOUS AS NEEDED
Status: DISCONTINUED | OUTPATIENT
Start: 2021-08-23 | End: 2021-08-23 | Stop reason: SURG

## 2021-08-23 RX ORDER — ROCURONIUM BROMIDE 10 MG/ML
INJECTION, SOLUTION INTRAVENOUS AS NEEDED
Status: DISCONTINUED | OUTPATIENT
Start: 2021-08-23 | End: 2021-08-23 | Stop reason: SURG

## 2021-08-23 RX ORDER — HYDROMORPHONE HYDROCHLORIDE 1 MG/ML
0.5 INJECTION, SOLUTION INTRAMUSCULAR; INTRAVENOUS; SUBCUTANEOUS EVERY 4 HOURS PRN
Status: DISCONTINUED | OUTPATIENT
Start: 2021-08-23 | End: 2021-08-24 | Stop reason: HOSPADM

## 2021-08-23 RX ORDER — SODIUM CHLORIDE 0.9 % (FLUSH) 0.9 %
3-10 SYRINGE (ML) INJECTION AS NEEDED
Status: DISCONTINUED | OUTPATIENT
Start: 2021-08-23 | End: 2021-08-23 | Stop reason: HOSPADM

## 2021-08-23 RX ORDER — LIDOCAINE HYDROCHLORIDE 10 MG/ML
0.5 INJECTION, SOLUTION EPIDURAL; INFILTRATION; INTRACAUDAL; PERINEURAL ONCE AS NEEDED
Status: DISCONTINUED | OUTPATIENT
Start: 2021-08-23 | End: 2021-08-23 | Stop reason: HOSPADM

## 2021-08-23 RX ORDER — CEFAZOLIN SODIUM 2 G/100ML
INJECTION, SOLUTION INTRAVENOUS AS NEEDED
Status: DISCONTINUED | OUTPATIENT
Start: 2021-08-23 | End: 2021-08-23 | Stop reason: SURG

## 2021-08-23 RX ORDER — SODIUM CHLORIDE 0.9 % (FLUSH) 0.9 %
1-10 SYRINGE (ML) INJECTION AS NEEDED
Status: DISCONTINUED | OUTPATIENT
Start: 2021-08-23 | End: 2021-08-24 | Stop reason: HOSPADM

## 2021-08-23 RX ORDER — PAROXETINE HYDROCHLORIDE 20 MG/1
20 TABLET, FILM COATED ORAL NIGHTLY
Status: DISCONTINUED | OUTPATIENT
Start: 2021-08-23 | End: 2021-08-24 | Stop reason: HOSPADM

## 2021-08-23 RX ORDER — DIPHENHYDRAMINE HCL 25 MG
25 CAPSULE ORAL
Status: DISCONTINUED | OUTPATIENT
Start: 2021-08-23 | End: 2021-08-23 | Stop reason: HOSPADM

## 2021-08-23 RX ORDER — ONDANSETRON 2 MG/ML
INJECTION INTRAMUSCULAR; INTRAVENOUS AS NEEDED
Status: DISCONTINUED | OUTPATIENT
Start: 2021-08-23 | End: 2021-08-23 | Stop reason: SURG

## 2021-08-23 RX ORDER — IBUPROFEN 600 MG/1
600 TABLET ORAL ONCE AS NEEDED
Status: DISCONTINUED | OUTPATIENT
Start: 2021-08-23 | End: 2021-08-23 | Stop reason: HOSPADM

## 2021-08-23 RX ORDER — DIPHENHYDRAMINE HYDROCHLORIDE 50 MG/ML
12.5 INJECTION INTRAMUSCULAR; INTRAVENOUS
Status: DISCONTINUED | OUTPATIENT
Start: 2021-08-23 | End: 2021-08-23 | Stop reason: HOSPADM

## 2021-08-23 RX ORDER — SODIUM CHLORIDE 0.9 % (FLUSH) 0.9 %
3 SYRINGE (ML) INJECTION EVERY 12 HOURS SCHEDULED
Status: DISCONTINUED | OUTPATIENT
Start: 2021-08-23 | End: 2021-08-24 | Stop reason: HOSPADM

## 2021-08-23 RX ORDER — ALBUTEROL SULFATE 2.5 MG/3ML
2.5 SOLUTION RESPIRATORY (INHALATION) ONCE AS NEEDED
Status: DISCONTINUED | OUTPATIENT
Start: 2021-08-23 | End: 2021-08-23 | Stop reason: HOSPADM

## 2021-08-23 RX ORDER — FERROUS SULFATE 325(65) MG
325 TABLET ORAL
Status: DISCONTINUED | OUTPATIENT
Start: 2021-08-24 | End: 2021-08-24 | Stop reason: HOSPADM

## 2021-08-23 RX ORDER — OXYCODONE AND ACETAMINOPHEN 10; 325 MG/1; MG/1
1 TABLET ORAL EVERY 4 HOURS PRN
Status: DISCONTINUED | OUTPATIENT
Start: 2021-08-23 | End: 2021-08-23 | Stop reason: HOSPADM

## 2021-08-23 RX ORDER — SODIUM CHLORIDE 0.9 % (FLUSH) 0.9 %
3 SYRINGE (ML) INJECTION EVERY 12 HOURS SCHEDULED
Status: DISCONTINUED | OUTPATIENT
Start: 2021-08-23 | End: 2021-08-23 | Stop reason: HOSPADM

## 2021-08-23 RX ORDER — HYDROCODONE BITARTRATE AND ACETAMINOPHEN 7.5; 325 MG/1; MG/1
1 TABLET ORAL ONCE AS NEEDED
Status: DISCONTINUED | OUTPATIENT
Start: 2021-08-23 | End: 2021-08-23 | Stop reason: HOSPADM

## 2021-08-23 RX ORDER — CEFAZOLIN SODIUM 2 G/100ML
2 INJECTION, SOLUTION INTRAVENOUS EVERY 8 HOURS
Status: COMPLETED | OUTPATIENT
Start: 2021-08-23 | End: 2021-08-24

## 2021-08-23 RX ORDER — HYDROMORPHONE HYDROCHLORIDE 1 MG/ML
0.25 INJECTION, SOLUTION INTRAMUSCULAR; INTRAVENOUS; SUBCUTANEOUS
Status: DISCONTINUED | OUTPATIENT
Start: 2021-08-23 | End: 2021-08-23 | Stop reason: HOSPADM

## 2021-08-23 RX ORDER — TRANEXAMIC ACID 100 MG/ML
INJECTION, SOLUTION INTRAVENOUS AS NEEDED
Status: DISCONTINUED | OUTPATIENT
Start: 2021-08-23 | End: 2021-08-23 | Stop reason: SURG

## 2021-08-23 RX ORDER — ONDANSETRON 4 MG/1
4 TABLET, FILM COATED ORAL EVERY 6 HOURS PRN
Status: DISCONTINUED | OUTPATIENT
Start: 2021-08-23 | End: 2021-08-24 | Stop reason: HOSPADM

## 2021-08-23 RX ORDER — DOCUSATE SODIUM 100 MG/1
100 CAPSULE, LIQUID FILLED ORAL 2 TIMES DAILY PRN
Status: DISCONTINUED | OUTPATIENT
Start: 2021-08-23 | End: 2021-08-24 | Stop reason: HOSPADM

## 2021-08-23 RX ORDER — NALOXONE HCL 0.4 MG/ML
0.1 VIAL (ML) INJECTION
Status: DISCONTINUED | OUTPATIENT
Start: 2021-08-23 | End: 2021-08-24 | Stop reason: HOSPADM

## 2021-08-23 RX ORDER — KETOROLAC TROMETHAMINE 30 MG/ML
15 INJECTION, SOLUTION INTRAMUSCULAR; INTRAVENOUS EVERY 6 HOURS PRN
Status: DISCONTINUED | OUTPATIENT
Start: 2021-08-23 | End: 2021-08-24 | Stop reason: HOSPADM

## 2021-08-23 RX ADMIN — PROPOFOL 110 MG: 10 INJECTION, EMULSION INTRAVENOUS at 09:43

## 2021-08-23 RX ADMIN — FENTANYL CITRATE 50 MCG: 50 INJECTION, SOLUTION INTRAMUSCULAR; INTRAVENOUS at 12:29

## 2021-08-23 RX ADMIN — OXYCODONE AND ACETAMINOPHEN 2 TABLET: 5; 325 TABLET ORAL at 20:45

## 2021-08-23 RX ADMIN — CEFAZOLIN SODIUM 2 G: 2 INJECTION, SOLUTION INTRAVENOUS at 09:58

## 2021-08-23 RX ADMIN — SODIUM CHLORIDE, POTASSIUM CHLORIDE, SODIUM LACTATE AND CALCIUM CHLORIDE 9 ML/HR: 600; 310; 30; 20 INJECTION, SOLUTION INTRAVENOUS at 08:37

## 2021-08-23 RX ADMIN — OXYCODONE AND ACETAMINOPHEN 1 TABLET: 5; 325 TABLET ORAL at 16:28

## 2021-08-23 RX ADMIN — ROCURONIUM BROMIDE 25 MG: 50 INJECTION INTRAVENOUS at 09:50

## 2021-08-23 RX ADMIN — SUCCINYLCHOLINE CHLORIDE 120 MG: 20 INJECTION, SOLUTION INTRAMUSCULAR; INTRAVENOUS; PARENTERAL at 09:43

## 2021-08-23 RX ADMIN — GLYCOPYRROLATE 0.2 MG: 0.2 INJECTION INTRAMUSCULAR; INTRAVENOUS at 09:39

## 2021-08-23 RX ADMIN — OXYBUTYNIN CHLORIDE 10 MG: 10 TABLET, EXTENDED RELEASE ORAL at 20:45

## 2021-08-23 RX ADMIN — DEXAMETHASONE SODIUM PHOSPHATE 8 MG: 10 INJECTION INTRAMUSCULAR; INTRAVENOUS at 10:05

## 2021-08-23 RX ADMIN — ACETAMINOPHEN 1000 MG: 500 TABLET, FILM COATED ORAL at 08:16

## 2021-08-23 RX ADMIN — CEFAZOLIN SODIUM 2 G: 2 INJECTION, SOLUTION INTRAVENOUS at 20:51

## 2021-08-23 RX ADMIN — ASPIRIN 325 MG: 325 TABLET, COATED ORAL at 16:28

## 2021-08-23 RX ADMIN — KETOROLAC TROMETHAMINE 15 MG: 30 INJECTION, SOLUTION INTRAMUSCULAR at 12:41

## 2021-08-23 RX ADMIN — SODIUM CHLORIDE, POTASSIUM CHLORIDE, SODIUM LACTATE AND CALCIUM CHLORIDE 100 ML/HR: 600; 310; 30; 20 INJECTION, SOLUTION INTRAVENOUS at 12:41

## 2021-08-23 RX ADMIN — TRANEXAMIC ACID 1000 MG: 1 INJECTION, SOLUTION INTRAVENOUS at 11:04

## 2021-08-23 RX ADMIN — ROCURONIUM BROMIDE 5 MG: 50 INJECTION INTRAVENOUS at 09:43

## 2021-08-23 RX ADMIN — ONDANSETRON 4 MG: 2 INJECTION INTRAMUSCULAR; INTRAVENOUS at 11:15

## 2021-08-23 RX ADMIN — LIDOCAINE HYDROCHLORIDE 80 MG: 20 INJECTION, SOLUTION INFILTRATION; PERINEURAL at 09:43

## 2021-08-23 RX ADMIN — LABETALOL HYDROCHLORIDE 5 MG: 5 INJECTION, SOLUTION INTRAVENOUS at 11:42

## 2021-08-23 RX ADMIN — FAMOTIDINE 20 MG: 10 INJECTION INTRAVENOUS at 08:38

## 2021-08-23 RX ADMIN — PAROXETINE HYDROCHLORIDE HEMIHYDRATE 20 MG: 20 TABLET, FILM COATED ORAL at 20:45

## 2021-08-23 RX ADMIN — CLINDAMYCIN PHOSPHATE 600 MG: 600 INJECTION, SOLUTION INTRAVENOUS at 13:01

## 2021-08-23 RX ADMIN — FENTANYL CITRATE 50 MCG: 50 INJECTION INTRAMUSCULAR; INTRAVENOUS at 09:39

## 2021-08-23 RX ADMIN — VANCOMYCIN HYDROCHLORIDE 1000 MG: 1 INJECTION, SOLUTION INTRAVENOUS at 08:37

## 2021-08-23 RX ADMIN — ONDANSETRON 4 MG: 2 INJECTION INTRAMUSCULAR; INTRAVENOUS at 12:06

## 2021-08-23 NOTE — ANESTHESIA PROCEDURE NOTES
Airway  Urgency: elective    Date/Time: 8/23/2021 9:43 AM  Airway not difficult    General Information and Staff    Patient location during procedure: OR  Anesthesiologist: Spike Hartman MD  CRNA: Mike Peck CRNA    Indications and Patient Condition  Indications for airway management: airway protection    Preoxygenated: yes  Mask difficulty assessment: 1 - vent by mask    Final Airway Details  Final airway type: endotracheal airway      Successful airway: ETT  Cuffed: yes   Successful intubation technique: direct laryngoscopy  Facilitating devices/methods: cricoid pressure  Endotracheal tube insertion site: oral  Blade: Jose  Blade size: 3  ETT size (mm): 7.0  Cormack-Lehane Classification: grade IIa - partial view of glottis  Placement verified by: chest auscultation and capnometry   Inital cuff pressure (cm H2O): 22  Cuff volume (mL): 7  Measured from: lips  ETT/EBT  to lips (cm): 21  Number of attempts at approach: 1

## 2021-08-23 NOTE — PERIOPERATIVE NURSING NOTE
Pt had nausea and vomiting after few minutes after vancomycin , dr kapadia made aware and ordered to stop and he will give her a different antibiotic in OR

## 2021-08-23 NOTE — PLAN OF CARE
Pt is an 82 yo F POD0 L TKA. Pt lives in [facility] with 3 steps B rails to enter, owns all DME including rolling walker and BSC. Pt currently requires CGA to ambulate 20ft in room, perform transfers and bed mobility. Demonstrates 5-90deg L Knee AROM. Skilled PT needed to address above impairments. DC recs include home with assist and HHPT to follow      Problem: Adult Inpatient Plan of Care  Goal: Plan of Care Review  Outcome: Ongoing, Progressing  Flowsheets (Taken 8/23/2021 1729)  Plan of Care Reviewed With: patient   Goal Outcome Evaluation:  Plan of Care Reviewed With: patient

## 2021-08-23 NOTE — ANESTHESIA POSTPROCEDURE EVALUATION
"Patient: Loc Calvo    Procedure Summary     Date: 08/23/21 Room / Location: [facility] OR [address] [place] [address] MAIN OR    Anesthesia Start: 0932 Anesthesia Stop: 1138    Procedure: TOTAL KNEE ARTHROPLASTY (Left Knee) Diagnosis:     Surgeons: Baldev Cannon MD Provider: Spike Hartman MD    Anesthesia Type: general ASA Status: 2          Anesthesia Type: general    Vitals  Vitals Value Taken Time   /68 08/23/21 1301   Temp 36.8 °C (98.3 °F) 08/23/21 1136   Pulse 75 08/23/21 1313   Resp 16 08/23/21 1245   SpO2 94 % 08/23/21 1313   Vitals shown include unvalidated device data.        Post Anesthesia Care and Evaluation    Patient location during evaluation: bedside  Patient participation: complete - patient participated  Level of consciousness: awake and alert  Pain score: 0  Pain management: adequate  Airway patency: patent  Anesthetic complications: No anesthetic complications    Cardiovascular status: acceptable  Respiratory status: acceptable  Hydration status: acceptable    Comments: /68   Pulse 72   Temp 36.8 °C (98.3 °F) (Oral)   Resp 16   Ht 170.2 cm (67\")   Wt 81.9 kg (180 lb 8.9 oz)   SpO2 93%   BMI 28.28 kg/m²       "

## 2021-08-23 NOTE — ADDENDUM NOTE
Addendum  created 08/23/21 1319 by Spike Hartman MD    Attestation recorded in Intraprocedure, Intraprocedure Attestations filed

## 2021-08-23 NOTE — THERAPY EVALUATION
Patient Name: Loc Calvo  : 1940    MRN: [id number]                              Today's Date: 2021       Admit Date: 2021    Visit Dx:     ICD-10-CM ICD-9-CM   1. S/P total knee arthroplasty, left  Z96.652 V43.65     Patient Active Problem List   Diagnosis   • Hair loss   • Chronic fatigue   • Elevated DHEA (CMS/HCC)   • DJD (degenerative joint disease) of knee     Past Medical History:   Diagnosis Date   • History of panic attacks    • Left knee pain    • Lung infection 2006    AT [facility]   • OAB (overactive bladder)      Past Surgical History:   Procedure Laterality Date   • COLONOSCOPY     • GALLBLADDER SURGERY     • HYSTERECTOMY     • LUNG BIOPSY     • REPLACEMENT TOTAL KNEE Right      General Information     Row Name 21 172          Physical Therapy Time and Intention    Document Type  evaluation  -AE     Mode of Treatment  individual therapy;physical therapy  -AE     Row Name 21 172          General Information    Patient Profile Reviewed  yes  -AE     Prior Level of Function  independent:  -AE     Existing Precautions/Restrictions  fall  -AE     Row Name 21 172          Living Environment    Lives With  spouse  -AE     Row Name 21 172          Home Main Entrance    Number of Stairs, Main Entrance  three  -AE     Stair Railings, Main Entrance  railings safe and in good condition;railing on right side (ascending)  -AE     Row Name 21 172          Stairs Within Home, Primary    Number of Stairs, Within Home, Primary  none  -AE     Row Name 21 172          Cognition    Orientation Status (Cognition)  oriented x 3  -AE     Row Name 21 172          Safety Issues, Functional Mobility    Impairments Affecting Function (Mobility)  range of motion (ROM);pain;strength  -AE       User Key  (r) = Recorded By, (t) = Taken By, (c) = Cosigned By    Initials Name Provider Type    AE Gwen Amaya PT Physical Therapist        Mobility      Row Name 08/23/21 1728          Bed Mobility    Bed Mobility  bed mobility (all) activities  -AE     All Activities, [place] (Bed Mobility)  contact guard assist  -AE     Row Name 08/23/21 1728          Transfers    Comment (Transfers)  CGA all transfers with FWW  -AE     Row Name 08/23/21 1728          Bed-Chair Transfer    Bed-Chair [place] (Transfers)  contact guard;1 person assist  -AE     Assistive Device (Bed-Chair Transfers)  walker, front-wheeled  -AE     Row Name 08/23/21 1728          Sit-Stand Transfer    Sit-Stand [place] (Transfers)  contact guard;1 person assist  -AE     Assistive Device (Sit-Stand Transfers)  walker, front-wheeled  -AE     Row Name 08/23/21 1728          Gait/Stairs (Locomotion)    [place] Level (Gait)  contact guard;1 person assist  -AE     Assistive Device (Gait)  walker, front-wheeled  -AE     Distance in Feet (Gait)  20ft  -AE     Deviations/Abnormal Patterns (Gait)  antalgic;anila decreased;festinating/shuffling;stride length decreased  -AE     Bilateral Gait Deviations  forward flexed posture;heel strike decreased  -AE       User Key  (r) = Recorded By, (t) = Taken By, (c) = Cosigned By    Initials Name Provider Type    AE Gwen Amaya PT Physical Therapist        Obj/Interventions     Row Name 08/23/21 1729          Range of Motion Comprehensive    Comment, General Range of Motion  L knee AROM 5-90  -AE     Row Name 08/23/21 1729          Strength Comprehensive (MMT)    Comment, General Manual Muscle Testing (MMT) Assessment  generalized weakness  -AE     Row Name 08/23/21 1729          Motor Skills    Therapeutic Exercise  knee L TKA x 10  -AE       User Key  (r) = Recorded By, (t) = Taken By, (c) = Cosigned By    Initials Name Provider Type    AE Gwen Amaya PT Physical Therapist        Goals/Plan     Row Name 08/23/21 1730          Transfer Goal 1 (PT)    Activity/Assistive Device (Transfer Goal 1, PT)  transfers, all  -AE     [place]  Level/Cues Needed (Transfer Goal 1, PT)  standby assist  -AE     Time Frame (Transfer Goal 1, PT)  3 days  -AE     Progress/Outcome (Transfer Goal 1, PT)  continuing progress toward goal  -AE     Row Name 08/23/21 4070          Gait Training Goal 1 (PT)    Activity/Assistive Device (Gait Training Goal 1, PT)  gait (walking locomotion);assistive device use  -AE     [place] Level (Gait Training Goal 1, PT)  standby assist  -AE     Distance (Gait Training Goal 1, PT)  100ft  -AE     Time Frame (Gait Training Goal 1, PT)  3 days  -AE     Progress/Outcome (Gait Training Goal 1, PT)  continuing progress toward goal  -AE     Row Name 08/23/21 7700          Stairs Goal 1 (PT)    Activity/Assistive Device (Stairs Goal 1, PT)  stairs, all skills  -AE     [place] Level/Cues Needed (Stairs Goal 1, PT)  contact guard assist;1 person assist  -AE     Number of Stairs (Stairs Goal 1, PT)  3 steps B rails  -AE     Time Frame (Stairs Goal 1, PT)  3 days  -AE     Progress/Outcome (Stairs Goal 1, PT)  continuing progress toward goal  -AE       User Key  (r) = Recorded By, (t) = Taken By, (c) = Cosigned By    Initials Name Provider Type    AE Gwen Amaya, PT Physical Therapist        Clinical Impression     Row Name 08/23/21 1910          Pain    Additional Documentation  Pain Scale: Numbers Pre/Post-Treatment (Group)  -AE     Row Name 08/23/21 0243          Pain Scale: Numbers Pre/Post-Treatment    Pretreatment Pain Rating  2/10  -AE     Posttreatment Pain Rating  3/10  -AE     Pain Location - Side  Left  -AE     Pain Location - Orientation  incisional  -AE     Pain Location  knee  -AE     Pain Intervention(s)  Repositioned;Ambulation/increased activity;Rest  -AE     Row Name 08/23/21 6516          Plan of Care Review    Plan of Care Reviewed With  patient  -AE     Row Name 08/23/21 9425          Therapy Assessment/Plan (PT)    Patient/Family Therapy Goals Statement (PT)  plans to DC home  -AE     Rehab Potential (PT)   good, to achieve stated therapy goals  -AE     Criteria for Skilled Interventions Met (PT)  yes;meets criteria  -AE     Row Name 08/23/21 1729          Positioning and Restraints    Pre-Treatment Position  in bed  -AE     Post Treatment Position  chair  -AE     In Chair  sitting;call light within reach;encouraged to call for assist;exit alarm on;notified nsg  -AE       User Key  (r) = Recorded By, (t) = Taken By, (c) = Cosigned By    Initials Name Provider Type    Gwen Mullen PT Physical Therapist        Outcome Measures     Row Name 08/23/21 1730          How much help from another person do you currently need...    Turning from your back to your side while in flat bed without using bedrails?  4  -AE     Moving from lying on back to sitting on the side of a flat bed without bedrails?  3  -AE     Moving to and from a bed to a chair (including a wheelchair)?  3  -AE     Standing up from a chair using your arms (e.g., wheelchair, bedside chair)?  3  -AE     Climbing 3-5 steps with a railing?  3  -AE     To walk in hospital room?  3  -AE     AM-PAC 6 Clicks Score (PT)  19  -AE     Row Name 08/23/21 1730          Functional Assessment    Outcome Measure Options  AM-PAC 6 Clicks Basic Mobility (PT)  -AE       User Key  (r) = Recorded By, (t) = Taken By, (c) = Cosigned By    Initials Name Provider Type    Gwen Mullen PT Physical Therapist                       Physical Therapy Education                 Title: PT OT SLP Therapies (Done)     Topic: Physical Therapy (Done)     Point: Mobility training (Done)     Learning Progress Summary           Patient Acceptance, E,TB, VU,NR by AE at 8/23/2021 1731                   Point: Home exercise program (Done)     Learning Progress Summary           Patient Acceptance, E,TB, VU,NR by AE at 8/23/2021 1731                   Point: Body mechanics (Done)     Learning Progress Summary           Patient Acceptance, E,TB, VU,NR by AE at 8/23/2021 1731                    Point: Precautions (Done)     Learning Progress Summary           Patient Acceptance, E,TB, VU,NR by AE at 8/23/2021 1731                               User Key     Initials Effective Dates Name Provider Type Discipline    AE 06/16/21 -  Gwen Amaya PT Physical Therapist PT              PT Recommendation and Plan  Planned Therapy Interventions (PT): balance training, bed mobility training, gait training, motor coordination training, neuromuscular re-education, patient/family education, postural re-education, ROM (range of motion), stair training, strengthening, stretching, transfer training  Plan of Care Reviewed With: patient     Time Calculation:   PT Charges     Row Name 08/23/21 1732             Time Calculation    Start Time  1630  -AE      Stop Time  1700  -AE      Time Calculation (min)  30 min  -AE      PT Received On  08/23/21  -AE      PT - Next Appointment  08/24/21  -AE      PT Goal Re-Cert Due Date  08/26/21  -AE         Time Calculation- PT    Total Timed Code Minutes- PT  20 minute(s)  -AE        User Key  (r) = Recorded By, (t) = Taken By, (c) = Cosigned By    Initials Name Provider Type    AE Gwen Amaya PT Physical Therapist        Therapy Charges for Today     Code Description Service Date Service Provider Modifiers Qty    01396682917 HC PT EVAL LOW COMPLEXITY 2 8/23/2021 Gwen Amaya, PT GP 1    30174909680 HC PT THER PROC EA 15 MIN 8/23/2021 Gwen Amaya PT GP 1          PT G-Codes  Outcome Measure Options: AM-PAC 6 Clicks Basic Mobility (PT)  AM-PAC 6 Clicks Score (PT): 19    Gwen Amaya PT  8/23/2021

## 2021-08-23 NOTE — OP NOTE
Orthopaedic Surgery   Left Total Knee  Dr. Baldev Cannon   [phone]    Patient Name:  Loc Calvo  YOB: 1940  Medical Records Number:  [id number]    Date of Procedure:  8/23/2021    Pre-operative Diagnosis:  DJD Left Knee with varus deformity.    Post-operative Diagnosis:  DJD Left Knee with varus deformity.    Procedure Performed:  Left Total Knee Replacement     Anesthesia: General, supplemented by a periarticular Exparel/bupivacaine injection.      Surgeon: Baldev Cannon MD     Assistant: Pat Bentlye PA-C; note that as part of the surgical procedure, I utilized service of an assistant surgeon, specifically Pat Bentley PA-C. Assistant surgeon participated in crucial portion of the operation. Use of the assistant surgeon greatly reduced overall operative time, thus significantly reducing overall morbidity for the patient.      Implants:    Implant Name Type Inv. Item Serial No.  Lot No. LRB No. Used Action   CMT BONE PALACOS R HI/VISC 1X40 - [id number] Implant CMT BONE PALACOS R HI/VISC 1X40  [employer] [id number] Left 1 Implanted   CMT BONE PALACOS R HI/VISC 1X40 - [id number] Implant CMT BONE PALACOS R HI/VISC 1X40  [employer] [id number] Left 1 Implanted   PATELLA RESRF JOURNEY 7.5X32MM RND - [id number] Implant PATELLA RESRF JOURNEY 7.5X32MM RND  [employer] [id number] Left 1 Implanted   COMP FEM JOURNEY2 BCS OXINIUM SZ6 LT - [id number] Implant COMP FEM JOURNEY2 BCS OXINIUM SZ6 LT  [employer] [id number] Left 1 Implanted   BASEPLT TIB JOURNEY NONPOR SZ4 LT - [id number] Implant BASEPLT TIB JOURNEY NONPOR SZ4 LT  [employer] [id number] Left 1 Implanted   INSRT ART/KN JOURNEY2 BCS CNSTR SZ3TO4 10MM RT - [id number] Implant INSRT ART/KN JOURNEY2 BCS CNSTR SZ3TO4 10MM RT  [employer] [id number] Left 1 Implanted       Implants utilized: I used the [employer] journey system.  I used a size 4 tibial baseplate.  Size 6 BCS femur.    10 PS mm   polyethylene insert.    32 patella.    Tourniquet Time: Was 62 minutes.      Medications: Note that we gave 1 g IV tranexamic acid when the cement was mixed.      Estimated Blood Loss:   minimal    Specimens:   Order Name Source Comment Collection Info Order Time   COVID PRE-OP / PRE-PROCEDURE SCREENING ORDER (NO ISOLATION) Nasopharynx   8/23/2021  7:37 AM     Please select your location:   [facility]          COVID Screening Order:   L&D/EMERGENT- CEPHEID/JAYY, 8-12 HR TAT [[id number]]          Previously tested for COVID-19?   Yes          Employed in healthcare setting?   No          Symptomatic for COVID-19 as defined by CDC?   No          Hospitalized for COVID-19?   No          Admitted to ICU for COVID-19?   No          Resident in a congregate (group) care setting?   No          Pregnant?   No          Release to patient   Immediate             Complications: None.      Quality Measures: I have documented the patient's current medications including dosage, frequency, and route of administration in medical record today. Conservative alternatives were discussed with the patient as part of the decision-making process prior to the procedure. The patient was evaluated immediately preoperatively for cardiovascular and thromboembolic risk factors.  There are no acute risk factors.  Prophylactic IV antibiotics were administered before the tourniquet went up.      Description of Procedure: After prep and drape, Left knee was approached via midline longitudinal anterior incision. Medial parapatellar arthrotomy was extended to the vastus medialis obliquus which was split in line with the fibers near the medial border, in keeping with minimally invasive technique. Patella was osteotomized proper depth for the patella implant. [place] holes are created. Patella was subluxed and protected. Intramedullary instrumentation was used on each side of the joint; careful and thorough canal content irrigation. I cut the femur 10 mm  depth, 5° valgus controlling rotation. The femur  was sized appropriatelyt. Anterior, posterior, and chamfer cuts were made. Tibia is cut 10 mm depth, 5° of posterior slope. Meniscectomies are completed. Trial reduction is performed. Rotation is marked and keel slot was created.  To balance his varus knee due to lateral capsular stretching, PCL resection and substitution was required.  I resected the intercondylar notch using standard instrumentation protecting popliteal neurovascular structures.     Bony surface was thoroughly cleaned and dried. I injected the posterior capsule and medial lateral gutter with Exparel solution containing 20 mL Exparel, 25 mL 0.25% bupivacaine with epinephrine, 20 mL saline. Care was taken to protect popliteal neurovascular structures and the peroneal nerve. I cemented the tibial baseplate,  Femur, and patella.  Trial reduction revealed a 10 mm PS polyethylene insert best balanced stability and range of motion, and is locked in the tibial tray.      Tourniquet was released; hemostasis obtained. Wound was closed in layers with #1 Vicryl on the capsule, 2-0 Vicryl in subcutaneous tissue, and zipline in the skin over a 1/8-inch drain. Note that I injected my capsule with my Exparel solution during closure.      Baldev Cannon MD  8/23/2021  12:48 EDT

## 2021-08-23 NOTE — PLAN OF CARE
Goal Outcome Evaluation:  Plan of Care Reviewed With: patient        Progress: improving  Outcome Summary: Admit s/p LTKA. A&Ox4. VSS. NVI. Dressing CDI, HV in place. Worked with PT, up with assist x1 and walker. Voiding function intact, BRP. Pain controlled. Plans to D/C home with HH. Education provided on fall prevention. Will cont to monitor.

## 2021-08-24 VITALS
SYSTOLIC BLOOD PRESSURE: 108 MMHG | HEART RATE: 79 BPM | OXYGEN SATURATION: 90 % | RESPIRATION RATE: 16 BRPM | BODY MASS INDEX: 28.34 KG/M2 | HEIGHT: 67 IN | TEMPERATURE: 98.1 F | WEIGHT: 180.56 LBS | DIASTOLIC BLOOD PRESSURE: 57 MMHG

## 2021-08-24 LAB
ANION GAP SERPL CALCULATED.3IONS-SCNC: 11.6 MMOL/L (ref 5–15)
BUN SERPL-MCNC: 22 MG/DL (ref 8–23)
BUN/CREAT SERPL: 22.4 (ref 7–25)
CALCIUM SPEC-SCNC: 8.4 MG/DL (ref 8.6–10.5)
CHLORIDE SERPL-SCNC: 100 MMOL/L (ref 98–107)
CO2 SERPL-SCNC: 23.4 MMOL/L (ref 22–29)
CREAT SERPL-MCNC: 0.98 MG/DL (ref 0.57–1)
GFR SERPL CREATININE-BSD FRML MDRD: 55 ML/MIN/1.73
GLUCOSE SERPL-MCNC: 130 MG/DL (ref 65–99)
HCT VFR BLD AUTO: 35.5 % (ref 34–46.6)
HGB BLD-MCNC: 11.5 G/DL (ref 12–15.9)
POTASSIUM SERPL-SCNC: 4.2 MMOL/L (ref 3.5–5.2)
SODIUM SERPL-SCNC: 135 MMOL/L (ref 136–145)

## 2021-08-24 PROCEDURE — 97110 THERAPEUTIC EXERCISES: CPT

## 2021-08-24 PROCEDURE — 63710000001 OXYCODONE-ACETAMINOPHEN 5-325 MG TABLET: Performed by: ORTHOPAEDIC SURGERY

## 2021-08-24 PROCEDURE — 80048 BASIC METABOLIC PNL TOTAL CA: CPT | Performed by: ORTHOPAEDIC SURGERY

## 2021-08-24 PROCEDURE — 85014 HEMATOCRIT: CPT | Performed by: ORTHOPAEDIC SURGERY

## 2021-08-24 PROCEDURE — A9270 NON-COVERED ITEM OR SERVICE: HCPCS | Performed by: ORTHOPAEDIC SURGERY

## 2021-08-24 PROCEDURE — 85018 HEMOGLOBIN: CPT | Performed by: ORTHOPAEDIC SURGERY

## 2021-08-24 PROCEDURE — 25010000003 CEFAZOLIN IN DEXTROSE 2-4 GM/100ML-% SOLUTION: Performed by: ORTHOPAEDIC SURGERY

## 2021-08-24 PROCEDURE — 97116 GAIT TRAINING THERAPY: CPT

## 2021-08-24 PROCEDURE — C9803 HOPD COVID-19 SPEC COLLECT: HCPCS

## 2021-08-24 PROCEDURE — 63710000001 FERROUS SULFATE 325 (65 FE) MG TABLET: Performed by: ORTHOPAEDIC SURGERY

## 2021-08-24 PROCEDURE — 63710000001 ASPIRIN EC 325 MG TABLET DELAYED-RELEASE: Performed by: ORTHOPAEDIC SURGERY

## 2021-08-24 RX ORDER — ASPIRIN 325 MG
325 TABLET, DELAYED RELEASE (ENTERIC COATED) ORAL DAILY
Qty: 42 TABLET | Refills: 0 | Status: SHIPPED | OUTPATIENT
Start: 2021-08-24 | End: 2021-08-24

## 2021-08-24 RX ORDER — ASPIRIN 325 MG
325 TABLET, DELAYED RELEASE (ENTERIC COATED) ORAL DAILY
Qty: 42 TABLET | Refills: 0 | Status: SHIPPED | OUTPATIENT
Start: 2021-08-24 | End: 2021-10-05

## 2021-08-24 RX ORDER — OXYCODONE HYDROCHLORIDE AND ACETAMINOPHEN 5; 325 MG/1; MG/1
1 TABLET ORAL EVERY 4 HOURS PRN
Qty: 50 TABLET | Refills: 0 | Status: SHIPPED | OUTPATIENT
Start: 2021-08-24 | End: 2021-08-24

## 2021-08-24 RX ORDER — OXYCODONE HYDROCHLORIDE AND ACETAMINOPHEN 5; 325 MG/1; MG/1
1 TABLET ORAL EVERY 4 HOURS PRN
Qty: 50 TABLET | Refills: 0 | Status: SHIPPED | OUTPATIENT
Start: 2021-08-24 | End: 2021-09-03 | Stop reason: HOSPADM

## 2021-08-24 RX ADMIN — OXYCODONE AND ACETAMINOPHEN 2 TABLET: 5; 325 TABLET ORAL at 01:00

## 2021-08-24 RX ADMIN — OXYCODONE AND ACETAMINOPHEN 2 TABLET: 5; 325 TABLET ORAL at 05:36

## 2021-08-24 RX ADMIN — ASPIRIN 325 MG: 325 TABLET, COATED ORAL at 09:33

## 2021-08-24 RX ADMIN — CEFAZOLIN SODIUM 2 G: 2 INJECTION, SOLUTION INTRAVENOUS at 05:26

## 2021-08-24 RX ADMIN — OXYCODONE AND ACETAMINOPHEN 2 TABLET: 5; 325 TABLET ORAL at 09:33

## 2021-08-24 RX ADMIN — FERROUS SULFATE TAB 325 MG (65 MG ELEMENTAL FE) 325 MG: 325 (65 FE) TAB at 09:33

## 2021-08-24 NOTE — PLAN OF CARE
Goal Outcome Evaluation:  Plan of Care Reviewed With: patient        Progress: improving  Outcome Summary: Pt remains stable. Ambulates with walker use and 1 assist. Voiding per BRP. Pain well controlled. Dressing is CDI with hv in place. No comorbidities to discuss. Plans to d/c home possibly today. DASIA.

## 2021-08-24 NOTE — CASE MANAGEMENT/SOCIAL WORK
Case Management Discharge Note      [facility]         Selected Continued Care - Discharged on 8/24/2021 Admission date: 8/23/2021 - Discharge disposition: Home or Self Care    Destination    No services have been selected for the patient.              Durable Medical Equipment    No services have been selected for the patient.              Dialysis/Infusion    No services have been selected for the patient.              Home Medical Care Coordination complete.    Service Provider Selected Services Address Phone Fax Patient Preferred    [facility] [id number] [address]  [place] [address] [phone] --          Therapy    No services have been selected for the patient.              Community Resources    No services have been selected for the patient.              Community & DME    No services have been selected for the patient.                       Final Discharge Disposition Code: 06 - home with home health care

## 2021-08-24 NOTE — CASE MANAGEMENT/SOCIAL WORK
Discharge Planning Assessment  [facility]     Patient Name: Loc Calvo  MRN: [id number]  Today's Date: 8/24/2021    Admit Date: 8/23/2021    Discharge Needs Assessment     Row Name 08/24/21 1138       Living Environment    Lives With  spouse    Current Living Arrangements  home/apartment/condo    Potentially Unsafe Housing Conditions  other (see comments) no concerns    Primary Care Provided by  self    Provides Primary Care For  no one    Family Caregiver if Needed  spouse    Quality of Family Relationships  helpful;involved;supportive    Able to Return to Prior Arrangements  yes       Resource/Environmental Concerns    Resource/Environmental Concerns  none    Transportation Concerns  car, none       Transition Planning    Patient/Family Anticipates Transition to  home    Patient/Family Anticipated Services at Transition  home health care    Transportation Anticipated  family or friend will provide       Discharge Needs Assessment    Readmission Within the Last 30 Days  no previous admission in last 30 days    Current Outpatient/Agency/Support Group  homecare agency    Equipment Currently Used at Home  cane, straight;walker, rolling    Concerns to be Addressed  no discharge needs identified;denies needs/concerns at this time    Anticipated Changes Related to Illness  none    Equipment Needed After Discharge  none    Outpatient/Agency/Support Group Needs  homecare agency    Discharge Facility/Level of Care Needs  home with home health        Discharge Plan     Row Name 08/24/21 1139       Plan    Plan  Home with Laura     Patient/Family in Agreement with Plan  yes    Plan Comments  CCP met with patient at bedside. CCP role explained and face sheet verified. Patient lives with her . Patient has walker at home. Patient denies any SNF history. Per care plan; plan is to return home with Laura . Patient confirmed plan and states her  and neighbor are driving her home at discharge. CCP will  follow for discharge home with [facility]. Carito WONG        Continued Care and Services - Admitted Since 8/23/2021     Home Medical Care     Service Provider Request Status Selected Services Address Phone Fax Patient Preferred    [facility] - [facility]  Pending - Request Sent N/A [id number] [place] [phone] --              Expected Discharge Date and Time     Expected Discharge Date Expected Discharge Time    Aug 24, 2021         Demographic Summary     Row Name 08/24/21 1137       General Information    Admission Type  observation    Referral Source  admission list    Reason for Consult  discharge planning    Preferred Language  English        Functional Status     Row Name 08/24/21 1138       Functional Status    Usual Activity Tolerance  good    Current Activity Tolerance  good       Functional Status, IADL    Medications  independent    Meal Preparation  independent    Housekeeping  independent    Laundry  independent    Shopping  independent       Mental Status    General Appearance WDL  WDL        Psychosocial    No documentation.       Abuse/Neglect    No documentation.       Legal    No documentation.       Substance Abuse    No documentation.       Patient Forms    No documentation.           MARVIN Neri

## 2021-08-24 NOTE — DISCHARGE SUMMARY
Discharge Summary   Baldev Cannon M.D.    NAME: Loc Calvo ADMIT: 2021   : 1940  PCP: Seth Eubanks PA    MRN: [id number] LOS: 1 days   AGE/SEX: 81 y.o. female  ROOM: P881/1       Date of Discharge: 2021    Primary Discharge Diagnosis:  DJD (degenerative joint disease) of knee [M17.10]    Secondary Discharge Diagnosis:    Problems Addressed this Visit        Musculoskeletal and Injuries    DJD (degenerative joint disease) of knee - Primary    Relevant Medications    oxyCODONE-acetaminophen (PERCOCET) 5-325 MG per tablet      Other Visit Diagnoses     S/P total knee arthroplasty, left        Relevant Orders    [facility]      Diagnoses       Codes Comments    Primary osteoarthritis of left knee    -  Primary ICD-10-CM: M17.12  ICD-9-CM: 715.16     S/P total knee arthroplasty, left     ICD-10-CM: Z96.652  ICD-9-CM: V43.65           Procedures Performed:  Left Total Knee Arthroplasty    Hospital Course:    Loc Calvo is a 81 y.o.  female who underwent successful Left Total Knee Arthroplasty on 2021.  Loc Calvo was started on Aspirin 325mg po daily immediately post-operatively for DVT prophylaxis.  On post-op day 1 the patients dressing was changed, drain removed and their incision was clean, with no signs of infection and their calf was soft, with no signs of DVT.  The patient progressed well with physical therapy and the patients hemoglobin remained stable. On post-operative day 1 the patient was felt ready for discharge.      Total Knee Joint Replacement Discharge Instructions:    I. ACTIVITIES:    1. Exercises:  ? Complete exercise program as taught by the hospital physical therapist 2 times per day  ? Exercise program will be advanced by the physical therapist  ? During the day be up ambulating every 2 hours (while awake) for short distances  ? Complete the ankle pump exercises at least 10 times per hour (while awake)  ? Elevate legs most of the day  the first week post operatively and thereafter elevate legs when in bed and for at least 30 minutes during the day. Caution must be taken to avoid pillow placement under the bend of the knee as this can led to flexion contractures of the knee.  ? Use cold packs 20-30 minutes approximately 5 times per day. This should be done before and after completing your exercises and at any time you are experiencing pain/ stiffness in your operative extremity.    2. Activities of Daily Living:  ? No tub baths, hot tubs, or swimming pools for 4 weeks  ? May shower and let water run over the incision on post-operative day #7 if no drainage. Do not scrub or rub the incision. Simply let the water run over the incision and pat dry.    II. Precautions:  ? Everyone that comes near you should wash their hands  ? No elective dental, genital-urinary, or colon procedures or surgical procedures for 12 weeks after surgery unless absolutely necessary.  ? If dental work or surgical procedure is deemed absolutely necessary during the first 12 weeks, you will need to contact your surgeon as you will need to take antibiotics 1 hour prior to any dental work (including teeth cleanings).  ? Please discuss with your surgeon prophylactic antibiotics as the length of time this intervention will be necessary for you varies with each patient’s health history and situation.  ? Avoid sick people. If you must be around someone who is ill, they should wear a mask.  ? Avoid visits to the Emergency Room or Urgent Care unless you are having a life threatening event.     III. INCISION CARE:  ? Wash your hands prior to dressing changes  ? Change the dressing as needed to keep incision clean and dry. Utilize dry gauze and paper tape. Avoid touching the side of the gauze that goes against the incision with your hands.  ? No creams or ointments to the incision  ? May remove dressing once the incision is free of drainage  ? Do not touch or pick at the  incision  ? Check incision every day and notify surgeon immediately if any of the following signs or symptoms are noted:  o Increase in redness  o Increase in swelling around the incision and of the entire extremity  o Increase in pain  o Drainage oozing from the incision  o Pulling apart of the edges of the incision  o Increase in overall body temperature (greater than 100.5 degrees)  ? Your surgeon will instruct you regarding suture or staple removal    IV. Medications:     1. Anticoagulants: You will be discharged on an anticoagulant. This is a prophylactic medication that helps prevent blood clots during your post-operative period. The type and length of dosage varies based on your individual needs, procedure performed, and surgeon’s preference.    ? While taking the anticoagulant, you should avoid taking any additional aspirin, ibuprofen (Advil or Motrin), Aleve (Naprosyn) or other non-steroidal anti-inflammatory medications.   ? Notify surgeon immediately if any margot bleeding is noted in the urine, stool, emesis, or from the nose or the incision. Blood in the stool will often appear as black rather than red. Blood in urine may appear as pink. Blood in emesis may appear as brown/black like coffee grounds.  ? You will need to apply pressure for longer periods of time to any cuts or abrasions to stop bleeding  ? Avoid alcohol while taking anticoagulants    2. Stool Softeners: You will be at greater risk of constipation after surgery due to being less mobile and the pain medications.     ? Take stool softeners as instructed by your surgeon while on pain medications. Bran cereal is most effective. Over the counter Colace 100 mg 1-2 capsules twice daily.   ? Drink plenty of fluids, and eat fruits and vegetables during your recovery time    3. Pain Medications utilized after surgery are narcotics and the law requires that the following information be given to all patients that are prescribed  narcotics:    ? CLASSIFICATION: Pain medications are called Opioids and are narcotics  ? LEGALITIES: It is illegal to share narcotics with others and to drive within 24 hours of taking narcotics  ? POTENTIAL SIDE EFFECTS: Potential side effects of opioids include: nausea, vomiting, itching, dizziness, drowsiness, dry mouth, constipation, and difficulty urinating.  ? POTENTIAL ADVERSE EFFECTS:   o Opioid tolerance can develop with use of pain medications and this simply means that it requires more and more of the medication to control pain; however, this is seen more in patients that use opioids for longer periods of time.  o Opioid dependence can develop with use of Opioids and this simply means that to stop the medication can cause withdrawal symptoms; however, this is seen with patients that use Opioids for longer periods of time.  o Opioid addiction can develop with use of Opioids and the incidence of this is very unlikely in patients who take the medications as ordered and stop the medications as instructed.  o Opioid overdose can be dangerous, but is unlikely when the medication is taken as ordered and stopped when ordered. It is important not to mix opioids with alcohol or with and type of sedative such as Benadryl as this can lead to over sedation and respiratory difficulty.  ? DOSAGE:   o Pain medications will need to be taken consistently for the first week to decrease pain and promote adequate pain relief and participation in physical therapy.  o After the initial surgical pain begins to resolve, you may begin to decrease the pain medication. By the end of 6-8 weeks, you should be off of pain medications.  o Refills will not be given by the office during evening hours, on weekends, or after 6-8 weeks post-op.  o To seek refills on pain medications during the initial 6 week post-operative period, you must call the office 48 hours in advance to request the refill. The office will then notify you when to pick  up the prescription. DO NOT wait until you are out of the medication to request a refill.    V. FOLLOW-UP VISITS:  ? You will need to follow up in the office with your surgeon in 3 weeks. Please call this number [phone] to schedule this appointment.  If you have any concerns or suspected complications prior to your follow up visit, please call your surgeons office. Do not wait until your appointment time if you suspect complications. These will need to be addressed in the office promptly.    Discharge Medications:     1) Percocet 1 po q 4-6 hours for pain control #50  2)  Aspirin 325 mg po daily for 6 weeks.      Baldev Cannon MD  8/24/2021  07:08 EDT

## 2021-08-24 NOTE — PLAN OF CARE
Pt has met all goals. Performs all mobility with Lokesh including ambulating 100ft, and ascending/descending 3 stairs with R HR. Demonstrates 15-90deg L knee AROM. Safe for DC home with HHPT to follow.      Problem: Adult Inpatient Plan of Care  Goal: Plan of Care Review  Outcome: Ongoing, Progressing  Flowsheets (Taken 8/24/2021 0331 by Tonia Barr RN)  Plan of Care Reviewed With: patient   Goal Outcome Evaluation:  Plan of Care Reviewed With: patient

## 2021-08-24 NOTE — DISCHARGE PLACEMENT REQUEST
"Loc Ricci (81 y.o. Female)     Date of Birth Social Security Number Address Home Phone MRN    1940  [address] [address] [phone] [id number]    Christianity Marital Status          [employer] [facility]        Admission Date Admission Type Admitting Provider Attending Provider Department, Room/Bed    8/23/21 Elective Baldev Cannon MD Sweet, Richard A, MD [address], P881/1    Discharge Date Discharge Disposition Discharge Destination         Home or Self Care              Attending Provider: Baldev Cannon MD    Allergies: Vancomycin, Augmentin [Amoxicillin-pot Clavulanate], Contrast Dye    Isolation: None   Infection: None   Code Status: CPR    Ht: 170.2 cm (67\")   Wt: 81.9 kg (180 lb 8.9 oz)    Admission Cmt: None   Principal Problem: None                Active Insurance as of 8/23/2021     Primary Coverage     Payor Plan Insurance Group Employer/Plan Group    MEDICARE MEDICARE A & B      Payor Plan Address Payor Plan Phone Number Payor Plan Fax Number Effective Dates    [address] [phone]  1/1/2006 - None Entered    [facility] [address]       Subscriber Name Subscriber Birth Date Member ID       LOC RICCI 1940 [id number]           Secondary Coverage     Payor Plan Insurance Group Employer/Plan Group    [facility] SUPP [id number]     Payor Plan Address Payor Plan Phone Number Payor Plan Fax Number Effective Dates    [address]   12/1/2016 - None Entered    [facility] [address]       Subscriber Name Subscriber Birth Date Member ID       LOC RICCI 1940 [id number]                 Emergency Contacts      (Rel.) Home Phone Work Phone Mobile Phone    CHÁVEZALDAIR WEISS (Daughter) [phone] -- --    KEIRY BHARGAV (Spouse) [phone] -- --              "

## 2021-08-24 NOTE — PROGRESS NOTES
"  Orthopaedic Surgery   Daily Progress Note  Dr. Baldev Cannon Sr  [phone]  DEMOGRAPHICS:   · Loc Calvo   · Age:81 y.o.   · MRN:[id number]  · Admitted: 8/23/2021    PROCEDURE: 1 Day Post-Op s/p Procedure(s):  TOTAL KNEE ARTHROPLASTY     /57 (BP Location: Left arm, Patient Position: Lying)   Pulse 79   Temp 98.1 °F (36.7 °C) (Oral)   Resp 16   Ht 170.2 cm (67\")   Wt 81.9 kg (180 lb 8.9 oz)   SpO2 90%   BMI 28.28 kg/m²     Lab Results (last 24 hours)     Procedure Component Value Units Date/Time    Basic Metabolic Panel [[id number]]  (Abnormal) Collected: 08/24/21 0444    Specimen: Blood Updated: 08/24/21 0554     Glucose 130 mg/dL      BUN 22 mg/dL      Creatinine 0.98 mg/dL      Sodium 135 mmol/L      Potassium 4.2 mmol/L      Chloride 100 mmol/L      CO2 23.4 mmol/L      Calcium 8.4 mg/dL      eGFR Non African Amer 55 mL/min/1.73      BUN/Creatinine Ratio 22.4     Anion Gap 11.6 mmol/L     Narrative:      GFR Normal >60  Chronic Kidney Disease <60  Kidney Failure <15      Hemoglobin & Hematocrit, Blood [[id number]]  (Abnormal) Collected: 08/24/21 0444    Specimen: Blood Updated: 08/24/21 0540     Hemoglobin 11.5 g/dL      Hematocrit 35.5 %     [facility] [[id number]] Collected: 08/23/21 1034    Specimen: Blood, Venous Line Updated: 08/23/21 6392    Narrative:      The following orders were created for panel order [facility].  Procedure                               Abnormality         Status                     ---------                               -----------         ------                     Hepatitis C Antibody[[id number]]                                                        Hepatitis B Surface Antigen[[id number]]  Normal              Final result               HIV-1 / O / 2 Ag / Antib...[[id number]]                                                   Please view results for these tests on the individual orders.    Hepatitis B Surface Antigen [[id number]]  (Normal) " Collected: 08/23/21 1034    Specimen: Blood, Venous Line Updated: 08/23/21 1139     Hepatitis B Surface Ag Non-Reactive          Imaging Results (Last 24 Hours)     Procedure Component Value Units Date/Time    XR Knee 1 or 2 View Left [[id number]] Collected: 08/23/21 1234     Updated: 08/23/21 1237    Narrative:      XR KNEE 1 OR 2 VW LEFT-     INDICATIONS: Postoperative evaluation.     TECHNIQUE: Frontal and lateral views of the left knee     COMPARISON: 08/09/2021     FINDINGS:      Intact appearing knee arthroplasty hardware is seen with adjacent  surgical soft tissue gas, drain. No acute fracture is identified.          Impression:         Postsurgical changes.           This report was finalized on 8/23/2021 12:34 PM by Dr. Agapito Murray M.D.             Patient Care Team:  Seth Eubanks PA as PCP - General (Internal Medicine)    SUBJECTIVE  Comfortable    PHYSICAL EXAM  Wound looks good  Neurovascular intact     ASSESSMENT: Patient is a 81 y.o. female who is 1 Day Post-Op s/p Procedure(s):  TOTAL KNEE ARTHROPLASTY     PLAN / DISPOSITION:  Aspirin for DVT prevention  She would like to go home today    Baldev Cannon Sr, MD  Orthopaedic Surgery  [facility]  [phone]

## 2021-08-25 LAB
HCV AB SER DONR QL: NORMAL
HIV1+2 AB SER QL: NORMAL

## 2021-08-31 ENCOUNTER — HOSPITAL ENCOUNTER (INPATIENT)
Facility: HOSPITAL | Age: 81
LOS: 2 days | Discharge: HOME HEALTH | End: 2021-09-03
Attending: ORTHOPAEDIC SURGERY | Admitting: ORTHOPAEDIC SURGERY

## 2021-08-31 LAB
CREAT SERPL-MCNC: 0.91 MG/DL (ref 0.57–1)
CRP SERPL-MCNC: 12.83 MG/DL (ref 0–0.5)
DEPRECATED RDW RBC AUTO: 43.7 FL (ref 37–54)
ERYTHROCYTE [DISTWIDTH] IN BLOOD BY AUTOMATED COUNT: 12 % (ref 12.3–15.4)
ERYTHROCYTE [SEDIMENTATION RATE] IN BLOOD: 55 MM/HR (ref 0–30)
GFR SERPL CREATININE-BSD FRML MDRD: 59 ML/MIN/1.73
HCT VFR BLD AUTO: 32.8 % (ref 34–46.6)
HGB BLD-MCNC: 10.7 G/DL (ref 12–15.9)
MCH RBC QN AUTO: 32.3 PG (ref 26.6–33)
MCHC RBC AUTO-ENTMCNC: 32.6 G/DL (ref 31.5–35.7)
MCV RBC AUTO: 99.1 FL (ref 79–97)
PLATELET # BLD AUTO: 297 10*3/MM3 (ref 140–450)
PMV BLD AUTO: 9.4 FL (ref 6–12)
RBC # BLD AUTO: 3.31 10*6/MM3 (ref 3.77–5.28)
SARS-COV-2 ORF1AB RESP QL NAA+PROBE: NOT DETECTED
WBC # BLD AUTO: 5.88 10*3/MM3 (ref 3.4–10.8)

## 2021-08-31 PROCEDURE — U0004 COV-19 TEST NON-CDC HGH THRU: HCPCS | Performed by: ORTHOPAEDIC SURGERY

## 2021-08-31 PROCEDURE — U0005 INFEC AGEN DETEC AMPLI PROBE: HCPCS | Performed by: ORTHOPAEDIC SURGERY

## 2021-08-31 PROCEDURE — 85027 COMPLETE CBC AUTOMATED: CPT | Performed by: ORTHOPAEDIC SURGERY

## 2021-08-31 PROCEDURE — 86140 C-REACTIVE PROTEIN: CPT | Performed by: ORTHOPAEDIC SURGERY

## 2021-08-31 PROCEDURE — 25010000002 VANCOMYCIN 10 G RECONSTITUTED SOLUTION: Performed by: ORTHOPAEDIC SURGERY

## 2021-08-31 PROCEDURE — 25010000002 CEFTRIAXONE PER 250 MG: Performed by: ORTHOPAEDIC SURGERY

## 2021-08-31 PROCEDURE — 85652 RBC SED RATE AUTOMATED: CPT | Performed by: ORTHOPAEDIC SURGERY

## 2021-08-31 PROCEDURE — 82565 ASSAY OF CREATININE: CPT | Performed by: ORTHOPAEDIC SURGERY

## 2021-08-31 PROCEDURE — G0378 HOSPITAL OBSERVATION PER HR: HCPCS

## 2021-08-31 RX ORDER — OXYCODONE HYDROCHLORIDE AND ACETAMINOPHEN 5; 325 MG/1; MG/1
1 TABLET ORAL EVERY 4 HOURS PRN
Status: DISCONTINUED | OUTPATIENT
Start: 2021-08-31 | End: 2021-09-03 | Stop reason: HOSPADM

## 2021-08-31 RX ORDER — OXYCODONE HYDROCHLORIDE AND ACETAMINOPHEN 5; 325 MG/1; MG/1
2 TABLET ORAL EVERY 4 HOURS PRN
Status: DISCONTINUED | OUTPATIENT
Start: 2021-08-31 | End: 2021-09-03 | Stop reason: HOSPADM

## 2021-08-31 RX ORDER — ZOLPIDEM TARTRATE 5 MG/1
5 TABLET ORAL NIGHTLY PRN
Status: DISCONTINUED | OUTPATIENT
Start: 2021-08-31 | End: 2021-09-03 | Stop reason: HOSPADM

## 2021-08-31 RX ORDER — SULFAMETHOXAZOLE AND TRIMETHOPRIM 800; 160 MG/1; MG/1
1 TABLET ORAL 2 TIMES DAILY
COMMUNITY

## 2021-08-31 RX ORDER — ASPIRIN 325 MG
325 TABLET, DELAYED RELEASE (ENTERIC COATED) ORAL DAILY
Status: DISCONTINUED | OUTPATIENT
Start: 2021-08-31 | End: 2021-09-03 | Stop reason: HOSPADM

## 2021-08-31 RX ORDER — OXYBUTYNIN CHLORIDE 5 MG/1
10 TABLET ORAL DAILY
Status: DISCONTINUED | OUTPATIENT
Start: 2021-08-31 | End: 2021-09-03 | Stop reason: HOSPADM

## 2021-08-31 RX ORDER — PAROXETINE HYDROCHLORIDE 20 MG/1
20 TABLET, FILM COATED ORAL NIGHTLY
Status: DISCONTINUED | OUTPATIENT
Start: 2021-08-31 | End: 2021-09-03 | Stop reason: HOSPADM

## 2021-08-31 RX ORDER — ONDANSETRON 4 MG/1
4 TABLET, FILM COATED ORAL EVERY 6 HOURS PRN
Status: DISCONTINUED | OUTPATIENT
Start: 2021-08-31 | End: 2021-09-03 | Stop reason: HOSPADM

## 2021-08-31 RX ADMIN — OXYCODONE AND ACETAMINOPHEN 1 TABLET: 5; 325 TABLET ORAL at 22:36

## 2021-08-31 RX ADMIN — ASPIRIN 325 MG: 325 TABLET, COATED ORAL at 14:19

## 2021-08-31 RX ADMIN — OXYCODONE AND ACETAMINOPHEN 1 TABLET: 5; 325 TABLET ORAL at 14:20

## 2021-08-31 RX ADMIN — CEFTRIAXONE 2 G: 2 INJECTION, POWDER, FOR SOLUTION INTRAMUSCULAR; INTRAVENOUS at 15:26

## 2021-08-31 RX ADMIN — VANCOMYCIN HYDROCHLORIDE 1750 MG: 10 INJECTION, POWDER, LYOPHILIZED, FOR SOLUTION INTRAVENOUS at 17:10

## 2021-08-31 RX ADMIN — ZOLPIDEM TARTRATE 5 MG: 5 TABLET ORAL at 20:57

## 2021-08-31 RX ADMIN — PAROXETINE HYDROCHLORIDE HEMIHYDRATE 20 MG: 20 TABLET, FILM COATED ORAL at 20:56

## 2021-08-31 RX ADMIN — OXYBUTYNIN CHLORIDE 10 MG: 5 TABLET ORAL at 14:19

## 2021-08-31 RX ADMIN — OXYCODONE AND ACETAMINOPHEN 1 TABLET: 5; 325 TABLET ORAL at 18:40

## 2021-09-01 PROBLEM — L03.90 CELLULITIS: Status: ACTIVE | Noted: 2021-09-01

## 2021-09-01 LAB
CREAT SERPL-MCNC: 0.75 MG/DL (ref 0.57–1)
GFR SERPL CREATININE-BSD FRML MDRD: 74 ML/MIN/1.73

## 2021-09-01 PROCEDURE — 25010000002 VANCOMYCIN 10 G RECONSTITUTED SOLUTION: Performed by: ORTHOPAEDIC SURGERY

## 2021-09-01 PROCEDURE — 25010000002 CEFTRIAXONE PER 250 MG: Performed by: ORTHOPAEDIC SURGERY

## 2021-09-01 PROCEDURE — 82565 ASSAY OF CREATININE: CPT | Performed by: ORTHOPAEDIC SURGERY

## 2021-09-01 PROCEDURE — 97110 THERAPEUTIC EXERCISES: CPT

## 2021-09-01 PROCEDURE — 97161 PT EVAL LOW COMPLEX 20 MIN: CPT

## 2021-09-01 RX ADMIN — OXYBUTYNIN CHLORIDE 10 MG: 5 TABLET ORAL at 08:37

## 2021-09-01 RX ADMIN — OXYCODONE AND ACETAMINOPHEN 2 TABLET: 5; 325 TABLET ORAL at 13:14

## 2021-09-01 RX ADMIN — VANCOMYCIN HYDROCHLORIDE 1250 MG: 10 INJECTION, POWDER, LYOPHILIZED, FOR SOLUTION INTRAVENOUS at 12:27

## 2021-09-01 RX ADMIN — PAROXETINE HYDROCHLORIDE HEMIHYDRATE 20 MG: 20 TABLET, FILM COATED ORAL at 20:34

## 2021-09-01 RX ADMIN — ASPIRIN 325 MG: 325 TABLET, COATED ORAL at 08:37

## 2021-09-01 RX ADMIN — OXYCODONE AND ACETAMINOPHEN 1 TABLET: 5; 325 TABLET ORAL at 06:04

## 2021-09-01 RX ADMIN — CEFTRIAXONE 2 G: 2 INJECTION, POWDER, FOR SOLUTION INTRAMUSCULAR; INTRAVENOUS at 15:19

## 2021-09-01 NOTE — THERAPY EVALUATION
Patient Name: Loc Calvo  : 1940    MRN: [id number]                              Today's Date: 2021       Admit Date: 2021    Visit Dx: No diagnosis found.  Patient Active Problem List   Diagnosis   • Hair loss   • Chronic fatigue   • Elevated DHEA (CMS/HCC)   • DJD (degenerative joint disease) of knee     Past Medical History:   Diagnosis Date   • History of panic attacks    • Left knee pain    • Lung infection     AT [facility]   • OAB (overactive bladder)      Past Surgical History:   Procedure Laterality Date   • COLONOSCOPY     • GALLBLADDER SURGERY     • HYSTERECTOMY     • LUNG BIOPSY     • REPLACEMENT TOTAL KNEE Right    • TOTAL KNEE ARTHROPLASTY Left 2021    Procedure: TOTAL KNEE ARTHROPLASTY;  Surgeon: Baldev Cannon MD;  Location: [facility];  Service: Orthopedics;  Laterality: Left;     General Information     Row Name 21 1113          Physical Therapy Time and Intention    Document Type  evaluation Pt. admitted with Left knee pain/redness/cellulitis;  Pt. with recent Left TKR (~ 9 days ago)  -MS     Mode of Treatment  physical therapy;individual therapy  -MS     Row Name 21 1113          General Information    Patient Profile Reviewed  yes  -MS     Prior Level of Function  independent:  -MS     Existing Precautions/Restrictions  (S) -- Exit alarm  -MS     Barriers to Rehab  none identified  -MS     Row Name 21 1113          Cognition    Orientation Status (Cognition)  oriented x 3  -MS     Row Name 21 1113          Safety Issues, Functional Mobility    Comment, Safety Issues/Impairments (Mobility)  Gait belt used for safety.  -MS       User Key  (r) = Recorded By, (t) = Taken By, (c) = Cosigned By    Initials Name Provider Type    Dimitrios Phoenix, PT Physical Therapist        Mobility     Row Name 21 1114          Bed Mobility    Bed Mobility  supine-sit;sit-supine  -MS     Supine-Sit Anderson (Bed Mobility)  standby  assist  -MS     Sit-Supine Alfalfa (Bed Mobility)  standby assist  -MS     Row Name 09/01/21 1114          Sit-Stand Transfer    Sit-Stand Alfalfa (Transfers)  contact guard  -MS     Assistive Device (Sit-Stand Transfers)  walker, front-wheeled  -MS     Row Name 09/01/21 1114          Gait/Stairs (Locomotion)    Alfalfa Level (Gait)  contact guard  -MS     Assistive Device (Gait)  walker, front-wheeled  -MS     Distance in Feet (Gait)  80 feet  -MS     Deviations/Abnormal Patterns (Gait)  antalgic  -MS     Bilateral Gait Deviations  forward flexed posture  -MS     Comment (Gait/Stairs)  Verbal/tactile cues for posture correction.  -MS     Row Name 09/01/21 1114          Mobility    Extremity Weight-bearing Status  left lower extremity  -MS     Left Lower Extremity (Weight-bearing Status)  weight-bearing as tolerated (WBAT)  -MS       User Key  (r) = Recorded By, (t) = Taken By, (c) = Cosigned By    Initials Name Provider Type    Dimitrios Phoenix PT Physical Therapist        Obj/Interventions     Row Name 09/01/21 1115          Range of Motion Comprehensive    Comment, General Range of Motion  BUE/RLE (WFL's);  Left knee AROM (3, 90)  -MS     Row Name 09/01/21 1115          Strength Comprehensive (MMT)    Comment, General Manual Muscle Testing (MMT) Assessment  BUE/RLE (3+/5)  -MS     Row Name 09/01/21 1115          Motor Skills    Therapeutic Exercise  -- Left TKR Ther. ex. program x 10 reps completed.  Encouraged pt. to continue these ther. ex. on her own while in hospital.  -MS       User Key  (r) = Recorded By, (t) = Taken By, (c) = Cosigned By    Initials Name Provider Type    Dimitrios Phoenix, PT Physical Therapist        Goals/Plan     Row Name 09/01/21 1116          Bed Mobility Goal 1 (PT)    Activity/Assistive Device (Bed Mobility Goal 1, PT)  bed mobility activities, all  -MS     Alfalfa Level/Cues Needed (Bed Mobility Goal 1, PT)  independent  -MS     Time Frame (Bed Mobility  Goal 1, PT)  long term goal (LTG);5 days  -MS     Row Name 09/01/21 1116          Transfer Goal 1 (PT)    Activity/Assistive Device (Transfer Goal 1, PT)  transfers, all;walker, rolling  -MS     Rogers Level/Cues Needed (Transfer Goal 1, PT)  independent  -MS     Time Frame (Transfer Goal 1, PT)  long term goal (LTG);5 days  -MS     Row Name 09/01/21 1116          Gait Training Goal 1 (PT)    Activity/Assistive Device (Gait Training Goal 1, PT)  gait (walking locomotion);walker, rolling  -MS     Rogers Level (Gait Training Goal 1, PT)  independent  -MS     Distance (Gait Training Goal 1, PT)  200 feet  -MS     Time Frame (Gait Training Goal 1, PT)  long term goal (LTG);5 days  -MS       User Key  (r) = Recorded By, (t) = Taken By, (c) = Cosigned By    Initials Name Provider Type    Dimitrios Phoenix, PT Physical Therapist        Clinical Impression     Row Name 09/01/21 1116          Pain    Additional Documentation  Pain Scale: Numbers Pre/Post-Treatment (Group)  -MS     [facility] Name 09/01/21 1116          Pain Scale: Numbers Pre/Post-Treatment    Pretreatment Pain Rating  3/10  -MS     Posttreatment Pain Rating  3/10  -MS     Pain Location - Side  Left  -MS     Pain Location  knee  -MS     Row Name 09/01/21 1116          Plan of Care Review    Plan of Care Reviewed With  patient  -MS     [facility] Name 09/01/21 1116          Therapy Assessment/Plan (PT)    Rehab Potential (PT)  good, to achieve stated therapy goals  -MS     Criteria for Skilled Interventions Met (PT)  skilled treatment is necessary  -MS     Row Name 09/01/21 1116          Positioning and Restraints    Pre-Treatment Position  in bed  -MS     Post Treatment Position  bed  -MS     In Bed  notified nsg;supine;call light within reach;encouraged to call for assist;exit alarm on  -MS       User Key  (r) = Recorded By, (t) = Taken By, (c) = Cosigned By    Initials Name Provider Type    Dimitrios Phoenix, PT Physical Therapist        Outcome  Measures     Row Name 09/01/21 1117          How much help from another person do you currently need...    Turning from your back to your side while in flat bed without using bedrails?  4  -MS     Moving from lying on back to sitting on the side of a flat bed without bedrails?  3  -MS     Moving to and from a bed to a chair (including a wheelchair)?  3  -MS     Standing up from a chair using your arms (e.g., wheelchair, bedside chair)?  3  -MS     Climbing 3-5 steps with a railing?  3  -MS     To walk in hospital room?  3  -MS     AM-PAC 6 Clicks Score (PT)  19  -MS     Row Name 09/01/21 1117          Functional Assessment    Outcome Measure Options  AM-PAC 6 Clicks Basic Mobility (PT)  -MS       User Key  (r) = Recorded By, (t) = Taken By, (c) = Cosigned By    Initials Name Provider Type    Dimitrios Phoenix, PT Physical Therapist                       Physical Therapy Education                 Title: PT OT SLP Therapies (Done)     Topic: Physical Therapy (Done)     Point: Mobility training (Done)     Learning Progress Summary           Patient Acceptance, E,D, VU by MS at 9/1/2021 1117                   Point: Home exercise program (Done)     Learning Progress Summary           Patient Acceptance, E,D, VU by MS at 9/1/2021 1117                   Point: Body mechanics (Done)     Learning Progress Summary           Patient Acceptance, E,D, VU by MS at 9/1/2021 1117                   Point: Precautions (Done)     Learning Progress Summary           Patient Acceptance, E,D, VU by MS at 9/1/2021 1117                               User Key     Initials Effective Dates Name Provider Type Discipline    MS 06/16/21 -  Dimitrios Hernandez PT Physical Therapist PT              PT Recommendation and Plan  Planned Therapy Interventions (PT): balance training, bed mobility training, home exercise program, gait training, patient/family education, postural re-education, transfer training, strengthening, ROM (range of  motion)  Plan of Care Reviewed With: patient  Outcome Summary: Pt. is an 81 year old Female admitted to the hospital with Left knee redness/pain/cellulitis. Pt. with a recent Left TKR (~9 days ago).  Pt. reports that just prior to admission she was ambulating at home with a Rwx.  Pt. currently presents with decreased strength, decreased balance, and decreased tolerance to functional activity. This AM, pt. able to ambulate 80 feet, CGA x 1, with use of Rwx. Pt. requires SBA x 1 for bed mobility and CGA x 1 for sit <-> stand transfers.  Left TKR ther. ex. program x 10 reps completed for general strengthening. Pt. will benefit from skilled inpt. P.T. to address her functional deficits and to assist pt. in regaining her maximum level of independence with functional mobility.     Time Calculation:   PT Charges     Row Name 09/01/21 1122             Time Calculation    Start Time  0917  -MS      Stop Time  0933  -MS      Time Calculation (min)  16 min  -MS      PT Received On  09/01/21  -MS      PT - Next Appointment  09/02/21  -MS      PT Goal Re-Cert Due Date  09/06/21  -MS         Time Calculation- PT    Total Timed Code Minutes- PT  15 minute(s)  -MS        User Key  (r) = Recorded By, (t) = Taken By, (c) = Cosigned By    Initials Name Provider Type    Dimitrios Phoenix, PT Physical Therapist        Therapy Charges for Today     Code Description Service Date Service Provider Modifiers Qty    42764159760 HC PT EVAL LOW COMPLEXITY 2 9/1/2021 Dimitrios Hernnadez, PT GP 1    85889116839 HC PT THER PROC EA 15 MIN 9/1/2021 Dimitrios Hernandez, PT GP 1          PT G-Codes  Outcome Measure Options: AM-PAC 6 Clicks Basic Mobility (PT)  AM-PAC 6 Clicks Score (PT): 19    Dimitrios Hernandez PT  9/1/2021

## 2021-09-01 NOTE — PLAN OF CARE
Goal Outcome Evaluation:  Plan of Care Reviewed With: patient           Outcome Summary: Pt. is an 81 year old Female admitted to the hospital with Left knee redness/pain/cellulitis. Pt. with a recent Left TKR (~9 days ago).  Pt. reports that just prior to admission she was ambulating at home with a Rwx.  Pt. currently presents with decreased strength, decreased balance, and decreased tolerance to functional activity. This AM, pt. able to ambulate 80 feet, CGA x 1, with use of Rwx. Pt. requires SBA x 1 for bed mobility and CGA x 1 for sit <-> stand transfers.  Left TKR ther. ex. program x 10 reps completed for general strengthening. Pt. will benefit from skilled inpt. P.T. to address her functional deficits and to assist pt. in regaining her maximum level of independence with functional mobility.    Patient was wearing a face mask during this therapy encounter. Therapist used appropriate personal protective equipment including eye protection, mask, and gloves.  Mask used was standard procedure mask. Appropriate PPE was worn during the entire therapy session. Hand hygiene was completed before and after therapy session. Patient is not in enhanced droplet precautions.

## 2021-09-01 NOTE — PLAN OF CARE
Goal Outcome Evaluation:  Plan of Care Reviewed With: patient        Progress: no change  Outcome Summary: Pt remains stable. Ambulates with walker use and SBA. Voiding per BRP. ABX continued as ordered for possible cellulitis. Pain well controlled. WCTM.

## 2021-09-01 NOTE — PROGRESS NOTES
"/85 (BP Location: Right arm, Patient Position: Lying)   Pulse 83   Temp 97.8 °F (36.6 °C) (Skin)   Resp 16   Ht 170.2 cm (67\")   Wt 81.8 kg (180 lb 5.4 oz)   SpO2 96%   BMI 28.24 kg/m²     Results from last 7 days   Lab Units 08/31/21  1433   WBC 10*3/mm3 5.88   HEMOGLOBIN g/dL 10.7*   HEMATOCRIT % 32.8*   PLATELETS 10*3/mm3 297       Results from last 7 days   Lab Units 09/01/21  0337   CREATININE mg/dL 0.75       Imaging Results (Last 24 Hours)     ** No results found for the last 24 hours. **          Patient Care Team:  Seth Eubanks PA as PCP - General (Internal Medicine)    SUBJECTIVE  Feeling better.  PHYSICAL EXAM  Wound with still erythema but less than yesterday.    ESR is 55  CRP is 12.83  Normal white count     * No active hospital problems. *      PLAN / DISPOSITION:  Continue IV antibiotics  Continue to mobilize the knee  GUILLAUME Pelayo  09/01/21  07:53 EDT    "

## 2021-09-01 NOTE — PROGRESS NOTES
"Pharmacokinetic Evaluation - Vancomycin    Loc Calvo is a 81 y.o. female on vancomycin pharmacy to dose.  MRN: [id number]  : 1940    Day of vancomycin therapy:  tentatively  Indication: knee infection  Target level: AUC24 400-600  Consulting Provider:  Dr. Cannon  Current Vancomycin Dose:   1250 mg iv q24       Blood pressure 128/82, pulse 80, temperature 97.6 °F (36.4 °C), temperature source Skin, resp. rate 16, height 170.2 cm (67\"), weight 81.8 kg (180 lb 5.4 oz), SpO2 97 %.  Results from last 7 days   Lab Units 21  0337 21  1433   CREATININE mg/dL 0.75 0.91     Estimated Creatinine Clearance: 61.7 mL/min (by C-G formula based on SCr of 0.75 mg/dL).  Results from last 7 days   Lab Units 21  1433   WBC 10*3/mm3 5.88   HEMOGLOBIN g/dL 10.7*   HEMATOCRIT % 32.8*   PLATELETS 10*3/mm3 297           Cultures:      Microbiology Results (last 10 days)     Procedure Component Value - Date/Time    COVID PRE-OP / PRE-PROCEDURE SCREENING ORDER (NO ISOLATION) - Swab, Nasopharynx [[id number]]  (Normal) Collected: 21    Lab Status: Final result Specimen: Swab from Nasopharynx Updated: 21    Narrative:      The following orders were created for panel order COVID PRE-OP / PRE-PROCEDURE SCREENING ORDER (NO ISOLATION) - Swab, Nasopharynx.  Procedure                               Abnormality         Status                     ---------                               -----------         ------                     COVID-19,APTIMA PANTHER(...[[id number]]  Normal              Final result                 Please view results for these tests on the individual orders.    COVID-19,APTIMA PANTHER(CARSON), ARAM, NP/OP SWAB IN UTM/VTM/SALINE TRANSPORT MEDIA,24 HR TAT - Swab, Nasopharynx [[id number]]  (Normal) Collected: 21 1259    Lab Status: Final result Specimen: Swab from Nasopharynx Updated: 21     COVID19 Not Detected    Narrative:      Fact sheet for providers: " "https://www.fda.gov/media/189205/download     Fact sheet for patients: https://www.fda.gov/media/651313/download    Test performed by RT PCR.    COVID PRE-OP / PRE-PROCEDURE SCREENING ORDER (NO ISOLATION) - Swab, Nasopharynx [[id number]]  (Normal) Collected: 08/23/21 0743    Lab Status: Final result Specimen: Swab from Nasopharynx Updated: 08/23/21 0833    Narrative:      The following orders were created for panel order COVID PRE-OP / PRE-PROCEDURE SCREENING ORDER (NO ISOLATION) - Swab, Nasopharynx.  Procedure                               Abnormality         Status                     ---------                               -----------         ------                     COVID-19,[facility] IN-HOUSE...[[id number]]  Normal              Final result                 Please view results for these tests on the individual orders.    COVID-19,[facility] IN-HOUSE CEPHEID/JAYY NP SWAB IN TRANSPORT MEDIA 8-12 HR TAT - Swab, Nasopharynx [[id number]]  (Normal) Collected: 08/23/21 0743    Lab Status: Final result Specimen: Swab from Nasopharynx Updated: 08/23/21 0833     COVID19 Not Detected    Narrative:      Fact sheet for providers: https://www.fda.gov/media/506363/download    Fact sheet for patients: https://www.fda.gov/media/325116/download    Test performed by PCR.          Dosing history   8/31 1710 1750 mg  1250 mg q24    9/1 1227    Assessment:  Bayesian analysis of the most recent level(s) using [employer] provides the following patient-specific pharmacokinetic parameters:   CL: 3.14 L/h   Vd: 68.2 L   T1/2: 16.7 h  If the predicted trough on this regimen is not within what was previously considered a \"target trough range\", the AUC24 (a stronger predictor of vancomycin efficacy) is predicted to achieve the accepted target of 400-600 mg*h/L. To best balance efficacy and toxicity, we will be targeting AUC24 in this patient rather than steady-state trough levels.     Continuing the regimen of 1250 mg (15.3mg/kg) IV every 24 hours " gives a predicted steady-state trough of 11.6 mg/L and AUC24 of 416 mg*h/L based upon population pharmacokinetics and this patient's specific parameters.      Plan:  1)Continue vancomycin 1250 mg iv q24  2) vanc trough prior to 9/2 dose  3) Encourage adequate hydration if appropriate. Monitor for decreased UOP, rash or other signs of vancomycin intolerance.    Thanks for this consult, will follow until Johan [email or web address], BCCCP

## 2021-09-01 NOTE — PROGRESS NOTES
"Patient admitted yesterday for concern of postoperative infection after knee replacement.  There was cellulitis around the incision with no concern at that time for deep infection.  Patient has been afebrile with a normal white count, however her CRP is over 12 and her sedimentation rate is over 50.  Pain is much better today.  The pain should mobilize the knee much better than she said she could yesterday.  She also says that her cellulitis has subsided.  There has been no drainage from the knee.  At this point I would continue the IV antibiotics for now.    R \"Sai\" Kassandra HONG MD  Orthopaedic Surgery  [facility]  [phone] - [place] Office  [phone] - [place] Office    "

## 2021-09-01 NOTE — PLAN OF CARE
Goal Outcome Evaluation:              Outcome Summary: L knee cellulitis, A&O, VSS, RA, IV abx, WBAT, assist x1, voiding per brp, optifoam dressing c/d/i w/zipline closure, continue to monitor knee

## 2021-09-02 ENCOUNTER — APPOINTMENT (OUTPATIENT)
Dept: GENERAL RADIOLOGY | Facility: HOSPITAL | Age: 81
End: 2021-09-02

## 2021-09-02 LAB
CREAT SERPL-MCNC: 0.74 MG/DL (ref 0.57–1)
CRP SERPL-MCNC: 10.03 MG/DL (ref 0–0.5)
ERYTHROCYTE [SEDIMENTATION RATE] IN BLOOD: 55 MM/HR (ref 0–30)
GFR SERPL CREATININE-BSD FRML MDRD: 76 ML/MIN/1.73
VANCOMYCIN TROUGH SERPL-MCNC: 11.4 MCG/ML (ref 5–20)

## 2021-09-02 PROCEDURE — 97110 THERAPEUTIC EXERCISES: CPT

## 2021-09-02 PROCEDURE — B548ZZA ULTRASONOGRAPHY OF SUPERIOR VENA CAVA, GUIDANCE: ICD-10-PCS | Performed by: ORTHOPAEDIC SURGERY

## 2021-09-02 PROCEDURE — 85652 RBC SED RATE AUTOMATED: CPT | Performed by: ORTHOPAEDIC SURGERY

## 2021-09-02 PROCEDURE — 80202 ASSAY OF VANCOMYCIN: CPT | Performed by: ORTHOPAEDIC SURGERY

## 2021-09-02 PROCEDURE — 02HV33Z INSERTION OF INFUSION DEVICE INTO SUPERIOR VENA CAVA, PERCUTANEOUS APPROACH: ICD-10-PCS | Performed by: ORTHOPAEDIC SURGERY

## 2021-09-02 PROCEDURE — 82565 ASSAY OF CREATININE: CPT | Performed by: ORTHOPAEDIC SURGERY

## 2021-09-02 PROCEDURE — 25010000002 CEFTRIAXONE PER 250 MG: Performed by: ORTHOPAEDIC SURGERY

## 2021-09-02 PROCEDURE — C1751 CATH, INF, PER/CENT/MIDLINE: HCPCS

## 2021-09-02 PROCEDURE — 25010000002 VANCOMYCIN 10 G RECONSTITUTED SOLUTION: Performed by: ORTHOPAEDIC SURGERY

## 2021-09-02 PROCEDURE — 86140 C-REACTIVE PROTEIN: CPT | Performed by: ORTHOPAEDIC SURGERY

## 2021-09-02 RX ADMIN — VANCOMYCIN HYDROCHLORIDE 1250 MG: 10 INJECTION, POWDER, LYOPHILIZED, FOR SOLUTION INTRAVENOUS at 12:05

## 2021-09-02 RX ADMIN — OXYCODONE AND ACETAMINOPHEN 2 TABLET: 5; 325 TABLET ORAL at 14:01

## 2021-09-02 RX ADMIN — OXYCODONE AND ACETAMINOPHEN 1 TABLET: 5; 325 TABLET ORAL at 18:34

## 2021-09-02 RX ADMIN — OXYBUTYNIN CHLORIDE 10 MG: 5 TABLET ORAL at 08:41

## 2021-09-02 RX ADMIN — CEFTRIAXONE 2 G: 2 INJECTION, POWDER, FOR SOLUTION INTRAMUSCULAR; INTRAVENOUS at 14:01

## 2021-09-02 RX ADMIN — OXYCODONE AND ACETAMINOPHEN 1 TABLET: 5; 325 TABLET ORAL at 02:00

## 2021-09-02 RX ADMIN — ZOLPIDEM TARTRATE 5 MG: 5 TABLET ORAL at 02:01

## 2021-09-02 RX ADMIN — PAROXETINE HYDROCHLORIDE HEMIHYDRATE 20 MG: 20 TABLET, FILM COATED ORAL at 22:16

## 2021-09-02 RX ADMIN — ASPIRIN 325 MG: 325 TABLET, COATED ORAL at 08:42

## 2021-09-02 RX ADMIN — OXYCODONE AND ACETAMINOPHEN 1 TABLET: 5; 325 TABLET ORAL at 08:42

## 2021-09-02 NOTE — PLAN OF CARE
Goal Outcome Evaluation:  Plan of Care Reviewed With: patient        Progress: no change  Outcome Summary: vss, patient voids frequently, states has OAB, left knee red, warm, swollen, neurovascular status o/w wnl, reports fair pain control, cont on iv abx, educated on monitoring vs due to infection, discharge plans are pending

## 2021-09-02 NOTE — CASE MANAGEMENT/SOCIAL WORK
Continued Stay Note  [facility]     Patient Name: Loc Calvo  MRN: [id number]  Today's Date: 9/2/2021    Admit Date: 8/31/2021    Discharge Plan     Row Name 09/02/21 1028       Plan    [facility]    Patient/Family in Agreement with Plan  yes    Plan Comments  Ortho's plan for 3 weeks of IV antibiotics noted. Spoke with the patient who would like to work with [facility]. Referral sent in [facility]. [facility] [place] [facility] who will look into the patient's case. Plan at this point is for the patient to d/c home with family support, [facility].        Discharge Codes    No documentation.             Barbara Cm RN

## 2021-09-02 NOTE — THERAPY TREATMENT NOTE
Patient Name: Loc Cavlo  : 1940    MRN: [id number]                              Today's Date: 2021       Admit Date: 2021    Visit Dx: No diagnosis found.  Patient Active Problem List   Diagnosis   • Hair loss   • Chronic fatigue   • Elevated DHEA (CMS/HCC)   • DJD (degenerative joint disease) of knee   • Cellulitis     Past Medical History:   Diagnosis Date   • History of panic attacks    • Left knee pain    • Lung infection     AT [facility]   • OAB (overactive bladder)      Past Surgical History:   Procedure Laterality Date   • COLONOSCOPY     • GALLBLADDER SURGERY     • HYSTERECTOMY     • LUNG BIOPSY     • REPLACEMENT TOTAL KNEE Right    • TOTAL KNEE ARTHROPLASTY Left 2021    Procedure: TOTAL KNEE ARTHROPLASTY;  Surgeon: Baldev Cannon MD;  Location: [facility];  Service: Orthopedics;  Laterality: Left;     General Information     Row Name 21 155          Physical Therapy Time and Intention    Document Type  therapy note (daily note)  -MS     Mode of Treatment  physical therapy;individual therapy  -MS     Row Name 21 155          General Information    Patient Profile Reviewed  yes  -MS     Barriers to Rehab  none identified  -MS     Row Name 21 155          Cognition    Orientation Status (Cognition)  oriented x 3  -MS     Row Name 21 155          Safety Issues, Functional Mobility    Comment, Safety Issues/Impairments (Mobility)  Gait belt used for safety.  -MS       User Key  (r) = Recorded By, (t) = Taken By, (c) = Cosigned By    Initials Name Provider Type    Dimitrios Phoenix, PT Physical Therapist        Mobility     Row Name 21 155          Bed Mobility    Supine-Sit [place] (Bed Mobility)  standby assist  -MS     Sit-Supine [place] (Bed Mobility)  standby assist  -MS     Row Name 21 155          Sit-Stand Transfer    Sit-Stand [place] (Transfers)  standby assist  -MS     Assistive Device  (Sit-Stand Transfers)  walker, front-wheeled  -MS     Row Name 09/02/21 1551          Gait/Stairs (Locomotion)    [place] Level (Gait)  standby assist  -MS     Distance in Feet (Gait)  350 feet  -MS     Deviations/Abnormal Patterns (Gait)  anila decreased  -MS     Row Name 09/02/21 1551          Mobility    Left Lower Extremity (Weight-bearing Status)  weight-bearing as tolerated (WBAT)  -MS       User Key  (r) = Recorded By, (t) = Taken By, (c) = Cosigned By    Initials Name Provider Type    Dimitrios Phoenix, PT Physical Therapist        Obj/Interventions     Row Name 09/02/21 1552          Motor Skills    Therapeutic Exercise  -- Left TKR ther. ex. program x 10 reps completed  -MS       User Key  (r) = Recorded By, (t) = Taken By, (c) = Cosigned By    Initials Name Provider Type    Dimitrios Phoenix, PT Physical Therapist        Goals/Plan    No documentation.       Clinical Impression     Row Name 09/02/21 1552          Pain Scale: Numbers Pre/Post-Treatment    Pretreatment Pain Rating  3/10  -MS     Posttreatment Pain Rating  3/10  -MS     Pain Location - Side  Left  -MS     Pain Location  knee  -MS     Row Name 09/02/21 1552          Positioning and Restraints    Pre-Treatment Position  in bed  -MS     Post Treatment Position  bed  -MS     In Bed  notified nsg;supine;call light within reach;encouraged to call for assist;exit alarm on All lines intact.  -MS       User Key  (r) = Recorded By, (t) = Taken By, (c) = Cosigned By    Initials Name Provider Type    Dimitrios Phoenix, PT Physical Therapist        Outcome Measures     Row Name 09/02/21 1553          How much help from another person do you currently need...    Turning from your back to your side while in flat bed without using bedrails?  4  -MS     Moving from lying on back to sitting on the side of a flat bed without bedrails?  3  -MS     Moving to and from a bed to a chair (including a wheelchair)?  3  -MS     Standing up from a chair  using your arms (e.g., wheelchair, bedside chair)?  3  -MS     Climbing 3-5 steps with a railing?  3  -MS     To walk in hospital room?  3  -MS     AM-PAC 6 Clicks Score (PT)  19  -MS     Row Name 09/02/21 1553          Functional Assessment    Outcome Measure Options  AM-PAC 6 Clicks Basic Mobility (PT)  -MS       User Key  (r) = Recorded By, (t) = Taken By, (c) = Cosigned By    Initials Name Provider Type    MS Dimitrios Hernandez, PT Physical Therapist                       Physical Therapy Education                 Title: PT OT SLP Therapies (Done)     Topic: Physical Therapy (Done)     Point: Mobility training (Done)     Learning Progress Summary           Patient Acceptance, E,D, VU,NR by MS at 9/2/2021 1553    Acceptance, E,D, VU by MS at 9/1/2021 1117                   Point: Home exercise program (Done)     Learning Progress Summary           Patient Acceptance, E,D, VU,NR by MS at 9/2/2021 1553    Acceptance, E,D, VU by MS at 9/1/2021 1117                   Point: Body mechanics (Done)     Learning Progress Summary           Patient Acceptance, E,D, VU,NR by MS at 9/2/2021 1553    Acceptance, E,D, VU by MS at 9/1/2021 1117                   Point: Precautions (Done)     Learning Progress Summary           Patient Acceptance, E,D, VU,NR by MS at 9/2/2021 1553    Acceptance, E,D, VU by MS at 9/1/2021 1117                               User Key     Initials Effective Dates Name Provider Type Discipline    MS 06/16/21 -  Dimitrios Hernandez PT Physical Therapist PT              PT Recommendation and Plan  Planned Therapy Interventions (PT): balance training, bed mobility training, home exercise program, gait training, patient/family education, postural re-education, transfer training, strengthening, ROM (range of motion)  Plan of Care Reviewed With: patient  Outcome Summary: Pt. able to ambulate 350 feet, SBA x 1, with use of Rwx this date. Pt. requires SBA x 1 for bed mobility and SBA x 1 for sit <-> stand  transfers.  Left TKR ther. ex. program x 10 reps completed for general strengthening.  Unsteady at times when upright but no overt losses of balance.     Time Calculation:   PT Charges     Row Name 09/02/21 1555             Time Calculation    Start Time  1400  -MS      Stop Time  1425  -MS      Time Calculation (min)  25 min  -MS      PT Received On  09/02/21  -MS      PT - Next Appointment  09/03/21  -MS         Time Calculation- PT    Total Timed Code Minutes- PT  24 minute(s)  -MS        User Key  (r) = Recorded By, (t) = Taken By, (c) = Cosigned By    Initials Name Provider Type    Dimitrios Phoenix, PT Physical Therapist        Therapy Charges for Today     Code Description Service Date Service Provider Modifiers Qty    62248489335 HC PT EVAL LOW COMPLEXITY 2 9/1/2021 Dimitrios Hernandez, PT GP 1    16878573373 HC PT THER PROC EA 15 MIN 9/1/2021 Dimitrios Hernandez, PT GP 1    89096623022 HC PT THER PROC EA 15 MIN 9/2/2021 Dimitrios Hernandez, PT GP 2          PT G-Codes  Outcome Measure Options: AM-PAC 6 Clicks Basic Mobility (PT)  AM-PAC 6 Clicks Score (PT): 19    Dimitrios Hernandez, PT  9/2/2021

## 2021-09-02 NOTE — PROGRESS NOTES
[facility]  Clinical Pharmacy Department     Vancomycin Pharmacokinetic Note    Loc Calvo is a 81 y.o. female who is on day 3 of pharmacy to dose vancomycin for infection of the knee.    Target level: AUC24 400-600  Consulting Provider:  Dr. Cannon  Current Vancomycin Dose:   1250 mg (15 mg/kg) IV every  24  hours  Planned Duration of Therapy: 3 weeks  Other Antimicrobials: Ceftriaxone 2g q 24h    Allergies  Allergies as of 08/31/2021 - Reviewed 08/31/2021   Allergen Reaction Noted   • Vancomycin Nausea And Vomiting 08/23/2021   • Augmentin [amoxicillin-pot clavulanate] Nausea And Vomiting 09/17/2018   • Contrast dye Unknown (See Comments) 09/17/2018       Microbiology:  Microbiology Results (last 10 days)     Procedure Component Value - Date/Time    COVID PRE-OP / PRE-PROCEDURE SCREENING ORDER (NO ISOLATION) - Swab, Nasopharynx [[id number]]  (Normal) Collected: 08/31/21 1259    Lab Status: Final result Specimen: Swab from Nasopharynx Updated: 08/31/21 1721    Narrative:      The following orders were created for panel order COVID PRE-OP / PRE-PROCEDURE SCREENING ORDER (NO ISOLATION) - Swab, Nasopharynx.  Procedure                               Abnormality         Status                     ---------                               -----------         ------                     COVID-19,APTIMA PANTHER(...[[id number]]  Normal              Final result                 Please view results for these tests on the individual orders.    COVID-19,APTIMA PANTHER(CARSON), ARAM, NP/OP SWAB IN UTM/VTM/SALINE TRANSPORT MEDIA,24 HR TAT - Swab, Nasopharynx [[id number]]  (Normal) Collected: 08/31/21 1259    Lab Status: Final result Specimen: Swab from Nasopharynx Updated: 08/31/21 1721     COVID19 Not Detected    Narrative:      Fact sheet for providers: https://www.fda.gov/media/751751/download     Fact sheet for patients: https://www.fda.gov/media/304538/download    Test performed by RT PCR.        Vitals/Labs/I&O  170.2 cm  "(67\")  81.8 kg (180 lb 5.4 oz)  Body mass index is 28.24 kg/m².   Temp:  [96.9 °F (36.1 °C)-97.8 °F (36.6 °C)] 96.9 °F (36.1 °C)  Heart Rate:  [66-81] 69  Resp:  [14-16] 16  BP: (130-150)/(71-77) 130/74    Results from last 7 days   Lab Units 08/31/21  1433   WBC 10*3/mm3 5.88              Results from last 7 days   Lab Units 09/02/21  0747 08/31/21  1433   CRP mg/dL 10.03* 12.83*       Results from last 7 days   Lab Units 09/02/21  1004 08/31/21  1433   SED RATE mm/hr 55* 55*        Estimated Creatinine Clearance: 61.7 mL/min (by C-G formula based on SCr of 0.74 mg/dL).  Results from last 7 days   Lab Units 09/02/21  0747 09/01/21  0337 08/31/21  1433   CREATININE mg/dL 0.74 0.75 0.91     Intake & Output (last 3 days)       08/30 0701 - 08/31 0700 08/31 0701 - 09/01 0700 09/01 0701 - 09/02 0700 09/02 0701 - 09/03 0700    P.O.  570 470 480    IV Piggyback   350 250    Total Intake(mL/kg)  570 (7) 820 (10) 730 (8.9)    Net  +570 +820 +730            Urine Unmeasured Occurrence  6 x 11 x 3 x    Stool Unmeasured Occurrence    1 x          Vancomycin Dosing and Level History:        Assessment/Plan:    Assessment:  Bayesian analysis of the most recent level(s) using [employer] provides the following patient-specific pharmacokinetic parameters:   CL: 3.24 L/h   Vd: 68.8 L   T1/2: 16.9 h  If the predicted trough on this regimen is not within what was previously considered a \"target trough range\", the AUC24 (a stronger predictor of vancomycin efficacy) is predicted to achieve the accepted target of 400-600 mg*h/L. To best balance efficacy and toxicity, we will be targeting AUC24 in this patient rather than steady-state trough levels.     Increasing the regimen to 1500 mg (18 mg/kg) IV every 24 hours gives a predicted steady-state trough of 13.2 mg/L and AUC24 of 476 mg*h/L.    Recommendations/Plan:  - Increase vancomycin regimen to 1500 mg (18 mg/kg) IV every 24 hours  - Obtain vancomycin level [in 3 days, prior to 6 dose " of new regimen] or sooner if acute changes  - Continue to monitor SCr      Thank you for involving pharmacy in this patient's care. Please contact pharmacy with any questions or concerns.                           Anaya Teofilo, PharmD, BCPS, BCCCP  Clinical Pharmacist Specialist  09/02/21 13:42 EDT

## 2021-09-02 NOTE — PROGRESS NOTES
"Still with good range of motion of the knee.  Does not appear to have improved in terms of the redness.  I am going to order repeat CRP and sedimentation rate for today.  I would recommend continuing IV antibiotics, I do not think this patient is ready for discharge yet.    R \"Sai\" Kassandra HONG MD  Orthopaedic Surgery  [facility]  [phone] - [place] Office  [phone] - [place] Office    "

## 2021-09-02 NOTE — PROGRESS NOTES
"/72 (BP Location: Left arm, Patient Position: Lying)   Pulse 66   Temp 96.9 °F (36.1 °C) (Skin)   Resp 16   Ht 170.2 cm (67\")   Wt 81.8 kg (180 lb 5.4 oz)   SpO2 96%   BMI 28.24 kg/m²     Results from last 7 days   Lab Units 08/31/21  1433   WBC 10*3/mm3 5.88   HEMOGLOBIN g/dL 10.7*   HEMATOCRIT % 32.8*   PLATELETS 10*3/mm3 297       Results from last 7 days   Lab Units 09/01/21  0337   CREATININE mg/dL 0.75       Imaging Results (Last 24 Hours)     ** No results found for the last 24 hours. **          Patient Care Team:  Seth Eubanks PA as PCP - General (Internal Medicine)    SUBJECTIVE  Still has some redness  PHYSICAL EXAM  Wound with erythema.  ROM is 0-70 degrees     Cellulitis      PLAN / DISPOSITION:  Repeat inflammatory markers  Arrange for home IV antibiotics for 3 weeks  PICC line ordered  Continue to monitor  GUILLAUME Pelayo  09/02/21  08:14 EDT    "

## 2021-09-02 NOTE — PLAN OF CARE
Goal Outcome Evaluation:  Plan of Care Reviewed With: patient           Outcome Summary: Pt. able to ambulate 350 feet, SBA x 1, with use of Rwx this date. Pt. requires SBA x 1 for bed mobility and SBA x 1 for sit <-> stand transfers.  Left TKR ther. ex. program x 10 reps completed for general strengthening.  Unsteady at times when upright but no overt losses of balance.    Patient was wearing a face mask during this therapy encounter. Therapist used appropriate personal protective equipment including eye protection, mask, and gloves.  Mask used was standard procedure mask. Appropriate PPE was worn during the entire therapy session. Hand hygiene was completed before and after therapy session. Patient is not in enhanced droplet precautions.

## 2021-09-03 VITALS
HEART RATE: 76 BPM | RESPIRATION RATE: 16 BRPM | WEIGHT: 180.34 LBS | BODY MASS INDEX: 28.3 KG/M2 | HEIGHT: 67 IN | OXYGEN SATURATION: 96 % | DIASTOLIC BLOOD PRESSURE: 79 MMHG | SYSTOLIC BLOOD PRESSURE: 126 MMHG | TEMPERATURE: 97.8 F

## 2021-09-03 PROCEDURE — 25010000002 CEFTRIAXONE PER 250 MG: Performed by: ORTHOPAEDIC SURGERY

## 2021-09-03 PROCEDURE — 25010000002 VANCOMYCIN 10 G RECONSTITUTED SOLUTION: Performed by: ORTHOPAEDIC SURGERY

## 2021-09-03 RX ORDER — OXYCODONE HYDROCHLORIDE AND ACETAMINOPHEN 5; 325 MG/1; MG/1
1 TABLET ORAL EVERY 4 HOURS PRN
Qty: 40 TABLET | Refills: 0 | Status: SHIPPED | OUTPATIENT
Start: 2021-09-03 | End: 2021-09-07

## 2021-09-03 RX ORDER — SODIUM CHLORIDE 0.9 % (FLUSH) 0.9 %
10 SYRINGE (ML) INJECTION AS NEEDED
Status: DISCONTINUED | OUTPATIENT
Start: 2021-09-03 | End: 2021-09-03 | Stop reason: HOSPADM

## 2021-09-03 RX ORDER — SODIUM CHLORIDE 0.9 % (FLUSH) 0.9 %
20 SYRINGE (ML) INJECTION AS NEEDED
Status: DISCONTINUED | OUTPATIENT
Start: 2021-09-03 | End: 2021-09-03 | Stop reason: HOSPADM

## 2021-09-03 RX ORDER — SODIUM CHLORIDE 0.9 % (FLUSH) 0.9 %
10 SYRINGE (ML) INJECTION EVERY 12 HOURS SCHEDULED
Status: DISCONTINUED | OUTPATIENT
Start: 2021-09-03 | End: 2021-09-03 | Stop reason: HOSPADM

## 2021-09-03 RX ADMIN — VANCOMYCIN HYDROCHLORIDE 1500 MG: 10 INJECTION, POWDER, LYOPHILIZED, FOR SOLUTION INTRAVENOUS at 08:47

## 2021-09-03 RX ADMIN — CEFTRIAXONE 2 G: 2 INJECTION, POWDER, FOR SOLUTION INTRAMUSCULAR; INTRAVENOUS at 13:24

## 2021-09-03 RX ADMIN — OXYBUTYNIN CHLORIDE 10 MG: 5 TABLET ORAL at 08:46

## 2021-09-03 RX ADMIN — OXYCODONE AND ACETAMINOPHEN 1 TABLET: 5; 325 TABLET ORAL at 04:12

## 2021-09-03 RX ADMIN — ASPIRIN 325 MG: 325 TABLET, COATED ORAL at 08:46

## 2021-09-03 NOTE — PLAN OF CARE
Goal Outcome Evaluation:              Outcome Summary: L knee cellulitis, A&O, VSS, RA, optifoam c/d/i, knee is red and warm to touch, voiding per brp, assist x1, IV abx, pt refuses alarms-educated on falls risk, PICC ordered and consent signted-LM w/ IV therapy, possibly home w/HH tomorrow, CTM

## 2021-09-03 NOTE — PLAN OF CARE
Goal Outcome Evaluation:  Plan of Care Reviewed With: patient        Progress: improving  Outcome Summary: VSS, ambulating with stand by assist, voiding well, PICC inserted, but placement needs to be readjusted by radiology tomorrow with flouroscopy, not to use PICC at present. minimal pain, reddness to knee has decreased. possible home tomorrow.

## 2021-09-03 NOTE — NURSING NOTE
Chest xray confirmed picc tip is axilla, spoke with Marie WILLINGHAM and recommended pt be sent to radiology in the am for a picc  wire exchange to get picc in SVC before discharge.

## 2021-09-03 NOTE — CASE MANAGEMENT/SOCIAL WORK
Continued Stay Note  [facility]     Patient Name: Loc Calvo  MRN: [id number]  Today's Date: 9/3/2021    Admit Date: 8/31/2021    Discharge Plan     Row Name 09/03/21 0920       Plan    Plan  Home with family support, [facility] & [facility].    Patient/Family in Agreement with Plan  yes    Plan Comments  Ortho's plan for 11 more days of IV antibiotics (for a total of 2 weeks) noted. Discussed with [facility]/I who will f/u with CCP regarding the cost of the antibiotics/supplies as well as what approx time to expect the [facility] IV Nurse to come to the bedside to provide education/supplies to the patient & her family. Updated the patient who's agreeable. Updated Allie/Laura who's agreeable with the d/c plan today and is following. Await a call back from [facility].        Discharge Codes    No documentation.       Expected Discharge Date and Time     Expected Discharge Date Expected Discharge Time    Sep 3, 2021             Barbara Cm RN

## 2021-09-03 NOTE — CASE MANAGEMENT/SOCIAL WORK
Continued Stay Note  [facility]     Patient Name: Loc Calvo  MRN: [id number]  Today's Date: 9/3/2021    Admit Date: 8/31/2021    Discharge Plan     Row Name 09/03/21 1112       Plan    Plan  Home with Laura and  infusion for IV abx    Plan Comments  CCP spoke with Antonia/ infusion; patient's cost for IV abx is $450. CCP spoke with patient and updated her on cost. Patient agreeable to cost. CCP discussed option of payment plan if needed; patient verbalized understanding. Per Antonia; unsure of what time abx will be delivered but will update CCP. CCP updated RN. Cartio ALW    Row Name 09/03/21 0941       Plan    Plan  Home with family support, Laura  & [facility].    Patient/Family in Agreement with Plan  yes    Plan Comments  Ortho's plan for 11 more days of IV antibiotics (for a total of 2 weeks) noted. Discussed with Judith/I who will f/u with CCP regarding the cost of the antibiotics/supplies as well as what approx time to expect the [facility] IV Nurse to come to the bedside to provide education/supplies to the patient & her family. Updated the patient who's agreeable. Updated Allie/Laura who's agreeable with the d/c plan today and is following. Await a call back from [facility]/I.        Discharge Codes    No documentation.       Expected Discharge Date and Time     Expected Discharge Date Expected Discharge Time    Sep 3, 2021             MARVIN Neri

## 2021-09-03 NOTE — CASE MANAGEMENT/SOCIAL WORK
Case Management Discharge Note      Final Note: Home with [facility] infusion and [facility]. Carito Guerrero CSW    Provided Post Acute Provider List?: Refused  Refused Provider List Comment: Current with [facility].    Selected Continued Care - Discharged on 9/3/2021 Admission date: 8/31/2021 - Discharge disposition: Home or Self Care    Destination    No services have been selected for the patient.              Durable Medical Equipment    No services have been selected for the patient.              Dialysis/Infusion Coordination complete.    Service Provider Selected Services Address Phone Fax Patient Preferred    [facility]  Infusion and IV Therapy [address], [place] [id number] [phone] --          Home Medical Care Coordination complete.    Service Provider Selected Services Address Phone Fax Patient Preferred    [facility] [address], [place] [address] [phone] --          Therapy    No services have been selected for the patient.              Community Resources    No services have been selected for the patient.              Community & DME    No services have been selected for the patient.                Selected Continued Care - Prior Encounters Includes selections from prior encounters from 6/2/2021 to 9/3/2021    Discharged on 8/24/2021 Admission date: 8/23/2021 - Discharge disposition: Home-Health Care [facility]    Home Medical Care     Service Provider Selected Services Address Phone Fax Patient Preferred    [facility] - [facility] [address], [place] [id number] [phone] --                         Final Discharge Disposition Code: 06 - home with home health care

## 2021-09-03 NOTE — PROGRESS NOTES
"Patient's knee looking better today.  PICC line placed yesterday but apparently needs to be readjusted today.  After discussion with the operating surgeon, my father, plan is to discharge home today with a total of 2 weeks of IV antibiotics and close outpatient follow-up with my father on 9/14.  She will be discharged with 11 more days of ceftriaxone and vancomycin.    R \"Sai\" Kassandra HONG MD  Orthopaedic Surgery  [facility]  [phone] - [place] [facility]  [phone] - [place] Office    "

## 2021-09-03 NOTE — SIGNIFICANT NOTE
09/02/21 2218   Pain/Comfort/Sleep   Preferred Pain Scale number (Numeric Rating Pain Scale)   (0-10) Pain Rating: Rest 0   (0-10) Pain Rating: Activity 0   Pain Location knee   Pain Side/Orientation left   Pain Description incisional   POSS ([place] Opioid-Induced Sed Scale) 1 - Awake and alert   PICC Single Lumen 09/02/21 Right Basilic   Placement Date/Time: 09/02/21 2205   Hand Hygiene Completed: Yes  Size (Fr): 4  Description (optional): single lumen picc  Length (cm): 36 cm  Orientation: Right  Location: Basilic  Site Prep: Chlorhexidine  All 5 Sterile Barriers Used (Gloves, Gown, ...   Site Assessment Clean;Dry;Intact   #1 Lumen Status Blood return noted;Capped;Flushed;Normal saline locked   Length aida (cm) 0 cm  (TRIMMED 36CM)   Extremity Circumference (cm) 34 cm   Dressing Type Border Dressing;Securing device;Antimicrobial dressing/disc   Dressing Status Clean;Dry;Intact   Dressing Intervention New dressing   Liquid Adhesive Contraindicated (comment)   Indication/Daily Review of Necessity intravenous fluid therapy;blood sampling;intravenous medication therapy   LOT#[id number] EXPIRES 2022-08-31    Picc placed for 3 weeks iv ABX with home health. Chart reviewed and no contraindications were found. Single lumen picc placed without difficulty. Ordered chest xray to verify picc tip location. Marie WILLINGHAM aware awaiting xray results, and not to use the picc at this time.

## 2021-09-03 NOTE — PLAN OF CARE
Goal Outcome Evaluation:        Pt stable for d/c home with home health. home health nurse came and educated on PICC line and antibx.       Juanita Ahumada BSN, RN

## 2021-09-09 NOTE — DISCHARGE SUMMARY
Discharge Summary   Baldev Cannon M.D.    NAME: Loc Calvo ADMIT: 2021   : 1940  PCP: Seth Eubanks PA    MRN: [id number] LOS: 4 days   AGE/SEX: 81 y.o. female  ROOM: 80       Date of Discharge: 9/3/2021    Primary Discharge Diagnosis:  Cellulitis [L03.90]    Secondary Discharge Diagnosis:    Problems Addressed this Visit     None      Diagnoses    None.         Procedures Performed:  Left Total Knee Arthroplasty    Hospital Course:    Loc Calvo is a 81 y.o.  female who was admitted 8 days status post left total knee replacement.  She had a hematoma and erythema but no fever.  Was felt she might have early cellulitis.    On admission she was placed on IV vancomycin and IV Rocephin.  She remained afebrile.  We did continue physical therapy.  On a daily basis her knee improved.  The discoloration started to resolve.  Her soreness and swelling are resolving.  There was never any sign that her infection was intra-articular.  On the day of discharge, we had placed a PICC line and she was going home on IV antibiotics.    Total Knee Joint Replacement Discharge Instructions:    I. ACTIVITIES:    1. Exercises:  ? Complete exercise program as taught by the hospital physical therapist 2 times per day  ? Exercise program will be advanced by the physical therapist  ? During the day be up ambulating every 2 hours (while awake) for short distances  ? Complete the ankle pump exercises at least 10 times per hour (while awake)  ? Elevate legs most of the day the first week post operatively and thereafter elevate legs when in bed and for at least 30 minutes during the day. Caution must be taken to avoid pillow placement under the bend of the knee as this can led to flexion contractures of the knee.  ? Use cold packs 20-30 minutes approximately 5 times per day. This should be done before and after completing your exercises and at any time you are experiencing pain/ stiffness in your operative  extremity.    2. Activities of Daily Living:  ? No tub baths, hot tubs, or swimming pools for 4 weeks  ? May shower and let water run over the incision on post-operative day #7 if no drainage. Do not scrub or rub the incision. Simply let the water run over the incision and pat dry.    II. Precautions:  ? Everyone that comes near you should wash their hands  ? No elective dental, genital-urinary, or colon procedures or surgical procedures for 12 weeks after surgery unless absolutely necessary.  ? If dental work or surgical procedure is deemed absolutely necessary during the first 12 weeks, you will need to contact your surgeon as you will need to take antibiotics 1 hour prior to any dental work (including teeth cleanings).  ? Please discuss with your surgeon prophylactic antibiotics as the length of time this intervention will be necessary for you varies with each patient’s health history and situation.  ? Avoid sick people. If you must be around someone who is ill, they should wear a mask.  ? Avoid visits to the Emergency Room or Urgent Care unless you are having a life threatening event.     III. INCISION CARE:  ? Wash your hands prior to dressing changes  ? Change the dressing as needed to keep incision clean and dry. Utilize dry gauze and paper tape. Avoid touching the side of the gauze that goes against the incision with your hands.  ? No creams or ointments to the incision  ? May remove dressing once the incision is free of drainage  ? Do not touch or pick at the incision  ? Check incision every day and notify surgeon immediately if any of the following signs or symptoms are noted:  o Increase in redness  o Increase in swelling around the incision and of the entire extremity  o Increase in pain  o Drainage oozing from the incision  o Pulling apart of the edges of the incision  o Increase in overall body temperature (greater than 100.5 degrees)  ? Your surgeon will instruct you regarding suture or staple  removal    IV. Medications:     1. Anticoagulants: You will be discharged on an anticoagulant. This is a prophylactic medication that helps prevent blood clots during your post-operative period. The type and length of dosage varies based on your individual needs, procedure performed, and surgeon’s preference.    ? While taking the anticoagulant, you should avoid taking any additional aspirin, ibuprofen (Advil or Motrin), Aleve (Naprosyn) or other non-steroidal anti-inflammatory medications.   ? Notify surgeon immediately if any margot bleeding is noted in the urine, stool, emesis, or from the nose or the incision. Blood in the stool will often appear as black rather than red. Blood in urine may appear as pink. Blood in emesis may appear as brown/black like coffee grounds.  ? You will need to apply pressure for longer periods of time to any cuts or abrasions to stop bleeding  ? Avoid alcohol while taking anticoagulants    2. Stool Softeners: You will be at greater risk of constipation after surgery due to being less mobile and the pain medications.     ? Take stool softeners as instructed by your surgeon while on pain medications. Bran cereal is most effective. Over the counter Colace 100 mg 1-2 capsules twice daily.   ? Drink plenty of fluids, and eat fruits and vegetables during your recovery time    3. Pain Medications utilized after surgery are narcotics and the law requires that the following information be given to all patients that are prescribed narcotics:    ? CLASSIFICATION: Pain medications are called Opioids and are narcotics  ? LEGALITIES: It is illegal to share narcotics with others and to drive within 24 hours of taking narcotics  ? POTENTIAL SIDE EFFECTS: Potential side effects of opioids include: nausea, vomiting, itching, dizziness, drowsiness, dry mouth, constipation, and difficulty urinating.  ? POTENTIAL ADVERSE EFFECTS:   o Opioid tolerance can develop with use of pain medications and this simply  means that it requires more and more of the medication to control pain; however, this is seen more in patients that use opioids for longer periods of time.  o Opioid dependence can develop with use of Opioids and this simply means that to stop the medication can cause withdrawal symptoms; however, this is seen with patients that use Opioids for longer periods of time.  o Opioid addiction can develop with use of Opioids and the incidence of this is very unlikely in patients who take the medications as ordered and stop the medications as instructed.  o Opioid overdose can be dangerous, but is unlikely when the medication is taken as ordered and stopped when ordered. It is important not to mix opioids with alcohol or with and type of sedative such as Benadryl as this can lead to over sedation and respiratory difficulty.  ? DOSAGE:   o Pain medications will need to be taken consistently for the first week to decrease pain and promote adequate pain relief and participation in physical therapy.  o After the initial surgical pain begins to resolve, you may begin to decrease the pain medication. By the end of 6-8 weeks, you should be off of pain medications.  o Refills will not be given by the office during evening hours, on weekends, or after 6-8 weeks post-op.  o To seek refills on pain medications during the initial 6 week post-operative period, you must call the office 48 hours in advance to request the refill. The office will then notify you when to  the prescription. DO NOT wait until you are out of the medication to request a refill.    V. FOLLOW-UP VISITS:  ? You will need to follow up in the office with your surgeon in 3 weeks. Please call this number [phone] to schedule this appointment.  If you have any concerns or suspected complications prior to your follow up visit, please call your surgeons office. Do not wait until your appointment time if you suspect complications. These will need to be  addressed in the office promptly.    Final diagnosis:  1.  Cellulitis left total knee replacement.  She was admitted 8 days postop.  She is responding to IV antibiotics.    Discharge plans:   She is discharged on IV antibiotics as noted below.  We will follow inflammatory markers.  I will recheck her in the office in 2 weeks.  We will continue physical therapy total knee replacement protocol weight-bear as tolerated.    Discharge Medications:     She is being discharged on IV vancomycin and IV Rocephin.    Baldev Cannon MD  9/9/2021  15:02 EDT

## 2021-09-22 ENCOUNTER — LAB (OUTPATIENT)
Dept: LAB | Facility: HOSPITAL | Age: 81
End: 2021-09-22

## 2021-09-22 ENCOUNTER — TRANSCRIBE ORDERS (OUTPATIENT)
Dept: ADMINISTRATIVE | Facility: HOSPITAL | Age: 81
End: 2021-09-22

## 2021-09-22 DIAGNOSIS — L08.9 FOREIGN BODY OF LEFT KNEE WITH INFECTION: Primary | ICD-10-CM

## 2021-09-22 DIAGNOSIS — L08.9 FOREIGN BODY OF LEFT KNEE WITH INFECTION: ICD-10-CM

## 2021-09-22 DIAGNOSIS — S80.252A FOREIGN BODY OF LEFT KNEE WITH INFECTION: Primary | ICD-10-CM

## 2021-09-22 DIAGNOSIS — S80.252A FOREIGN BODY OF LEFT KNEE WITH INFECTION: ICD-10-CM

## 2021-09-22 LAB
BASOPHILS # BLD AUTO: 0.06 10*3/MM3 (ref 0–0.2)
BASOPHILS NFR BLD AUTO: 0.9 % (ref 0–1.5)
CRP SERPL-MCNC: 0.7 MG/DL (ref 0–0.5)
DEPRECATED RDW RBC AUTO: 44.5 FL (ref 37–54)
EOSINOPHIL # BLD AUTO: 0.24 10*3/MM3 (ref 0–0.4)
EOSINOPHIL NFR BLD AUTO: 3.6 % (ref 0.3–6.2)
ERYTHROCYTE [DISTWIDTH] IN BLOOD BY AUTOMATED COUNT: 12.6 % (ref 12.3–15.4)
ERYTHROCYTE [SEDIMENTATION RATE] IN BLOOD: 8 MM/HR (ref 0–30)
HCT VFR BLD AUTO: 40.8 % (ref 34–46.6)
HGB BLD-MCNC: 13.3 G/DL (ref 12–15.9)
IMM GRANULOCYTES # BLD AUTO: 0.03 10*3/MM3 (ref 0–0.05)
IMM GRANULOCYTES NFR BLD AUTO: 0.4 % (ref 0–0.5)
LYMPHOCYTES # BLD AUTO: 1.44 10*3/MM3 (ref 0.7–3.1)
LYMPHOCYTES NFR BLD AUTO: 21.5 % (ref 19.6–45.3)
MCH RBC QN AUTO: 31.5 PG (ref 26.6–33)
MCHC RBC AUTO-ENTMCNC: 32.6 G/DL (ref 31.5–35.7)
MCV RBC AUTO: 96.7 FL (ref 79–97)
MONOCYTES # BLD AUTO: 0.54 10*3/MM3 (ref 0.1–0.9)
MONOCYTES NFR BLD AUTO: 8.1 % (ref 5–12)
NEUTROPHILS NFR BLD AUTO: 4.39 10*3/MM3 (ref 1.7–7)
NEUTROPHILS NFR BLD AUTO: 65.5 % (ref 42.7–76)
NRBC BLD AUTO-RTO: 0 /100 WBC (ref 0–0.2)
PLATELET # BLD AUTO: 225 10*3/MM3 (ref 140–450)
PMV BLD AUTO: 9.4 FL (ref 6–12)
RBC # BLD AUTO: 4.22 10*6/MM3 (ref 3.77–5.28)
WBC # BLD AUTO: 6.7 10*3/MM3 (ref 3.4–10.8)

## 2021-09-22 PROCEDURE — 36415 COLL VENOUS BLD VENIPUNCTURE: CPT

## 2021-09-22 PROCEDURE — 85652 RBC SED RATE AUTOMATED: CPT

## 2021-09-22 PROCEDURE — 85025 COMPLETE CBC W/AUTO DIFF WBC: CPT

## 2021-09-22 PROCEDURE — 86140 C-REACTIVE PROTEIN: CPT

## 2024-07-30 NOTE — ANESTHESIA PREPROCEDURE EVALUATION
Anesthesia Evaluation     Patient summary reviewed and Nursing notes reviewed                Airway   Mallampati: II  TM distance: >3 FB  Dental      Pulmonary - negative pulmonary ROS   Cardiovascular - negative cardio ROS    ECG reviewed  Rhythm: regular  Rate: normal        Neuro/Psych- negative ROS  GI/Hepatic/Renal/Endo - negative ROS     Musculoskeletal     Abdominal    Substance History - negative use     OB/GYN negative ob/gyn ROS         Other   arthritis,                      Anesthesia Plan    ASA 2     general   (I have reviewed the patient's history with the patient and the chart, including all pertinent laboratory results and imaging. I have explained the risks of anesthesia including but not limited to dental damage, corneal abrasion, nerve injury, MI, stroke, and death. Questions asked and answered. Anesthetic plan discussed with patient and team as indicated. Patient expressed understanding of the above.  )  intravenous induction     Anesthetic plan, all risks, benefits, and alternatives have been provided, discussed and informed consent has been obtained with: patient.       Keep follow-up with oncology and urology

## (undated) DEVICE — ZIP 16 SURGICAL SKIN CLOSURE DEVICE, PSA: Brand: ZIP 16 SURGICAL SKIN CLOSURE DEVICE

## (undated) DEVICE — SYR LUERLOK 20CC BX/50

## (undated) DEVICE — GLV SURG PREMIERPRO ORTHO LTX PF SZ8.5 BRN

## (undated) DEVICE — 2108 SERIES SAGITTAL BLADE, NO OFFSET  (12.4 X 1.19 X 82.1MM)

## (undated) DEVICE — UNDERCAST PADDING: Brand: DEROYAL

## (undated) DEVICE — PENCL E/S ULTRAVAC TELESCP NOSE HOLSTR 10FT

## (undated) DEVICE — GAUZE,SPONGE,FLUFF,6"X6.75",STRL,10/TRAY: Brand: MEDLINE

## (undated) DEVICE — KT DRN EVAC WND PVC PCH WTROC RND 10F400

## (undated) DEVICE — SOL NACL 0.9PCT 1000ML

## (undated) DEVICE — APPL DURAPREP IODOPHOR APL 26ML

## (undated) DEVICE — ANTIBACTERIAL UNDYED BRAIDED (POLYGLACTIN 910), SYNTHETIC ABSORBABLE SUTURE: Brand: COATED VICRYL

## (undated) DEVICE — DUAL CUT SAGITTAL BLADE

## (undated) DEVICE — GLV SURG SIGNATURE ESSENTIAL PF LTX SZ8.5

## (undated) DEVICE — DRSNG WND GZ CURAD OIL EMULSION 3X8IN LF STRL 1PK

## (undated) DEVICE — TRAP FLD MINIVAC MEGADYNE 100ML

## (undated) DEVICE — PK KN TOTL 40

## (undated) DEVICE — BNDG ELAS ELITE V/CLOSE 6IN 5YD LF STRL

## (undated) DEVICE — NEEDLE, QUINCKE, 20GX3.5": Brand: MEDLINE

## (undated) DEVICE — BANDAGE,GAUZE,BULKEE II,4.5"X4.1YD,STRL: Brand: MEDLINE

## (undated) DEVICE — CONTAINER,SPECIMEN,OR STERILE,4OZ: Brand: MEDLINE